# Patient Record
Sex: MALE | Race: WHITE | NOT HISPANIC OR LATINO | Employment: FULL TIME | URBAN - METROPOLITAN AREA
[De-identification: names, ages, dates, MRNs, and addresses within clinical notes are randomized per-mention and may not be internally consistent; named-entity substitution may affect disease eponyms.]

---

## 2017-01-31 DIAGNOSIS — M25.539 PAIN IN WRIST, UNSPECIFIED LATERALITY: Primary | ICD-10-CM

## 2017-06-12 RX ORDER — ATORVASTATIN CALCIUM 10 MG/1
TABLET, FILM COATED ORAL
Qty: 90 TABLET | Refills: 0 | Status: SHIPPED | OUTPATIENT
Start: 2017-06-12 | End: 2017-07-05 | Stop reason: SDUPTHER

## 2017-06-12 RX ORDER — LISINOPRIL 20 MG/1
TABLET ORAL
Qty: 90 TABLET | Refills: 0 | Status: SHIPPED | OUTPATIENT
Start: 2017-06-12 | End: 2017-07-05 | Stop reason: SDUPTHER

## 2017-06-29 VITALS — HEIGHT: 67 IN | WEIGHT: 238.38 LBS | BODY MASS INDEX: 37.42 KG/M2

## 2017-06-29 RX ORDER — VENLAFAXINE HYDROCHLORIDE 150 MG/1
1 CAPSULE, EXTENDED RELEASE ORAL DAILY
COMMUNITY
Start: 2017-02-06 | End: 2017-07-05 | Stop reason: ALTCHOICE

## 2017-07-05 ENCOUNTER — OFFICE VISIT (OUTPATIENT)
Dept: FAMILY MEDICINE | Facility: CLINIC | Age: 42
End: 2017-07-05
Payer: COMMERCIAL

## 2017-07-05 VITALS
HEART RATE: 85 BPM | HEIGHT: 67 IN | WEIGHT: 236 LBS | DIASTOLIC BLOOD PRESSURE: 80 MMHG | SYSTOLIC BLOOD PRESSURE: 132 MMHG | BODY MASS INDEX: 37.04 KG/M2 | OXYGEN SATURATION: 99 %

## 2017-07-05 DIAGNOSIS — E78.5 DYSLIPIDEMIA: ICD-10-CM

## 2017-07-05 DIAGNOSIS — F33.1 MAJOR DEPRESSIVE DISORDER, RECURRENT, MODERATE: ICD-10-CM

## 2017-07-05 DIAGNOSIS — I10 ESSENTIAL HYPERTENSION: Primary | ICD-10-CM

## 2017-07-05 DIAGNOSIS — Z78.9 NON-SMOKER: ICD-10-CM

## 2017-07-05 PROBLEM — G47.00 INSOMNIA: Status: ACTIVE | Noted: 2017-07-05

## 2017-07-05 PROBLEM — F41.8 MIXED ANXIETY DEPRESSIVE DISORDER: Status: ACTIVE | Noted: 2017-07-05

## 2017-07-05 PROCEDURE — 99214 OFFICE O/P EST MOD 30 MIN: CPT | Mod: ,,, | Performed by: NURSE PRACTITIONER

## 2017-07-05 RX ORDER — ATORVASTATIN CALCIUM 20 MG/1
20 TABLET, FILM COATED ORAL NIGHTLY
Qty: 90 TABLET | Refills: 1 | Status: SHIPPED | OUTPATIENT
Start: 2017-07-05 | End: 2018-05-26 | Stop reason: SDUPTHER

## 2017-07-05 RX ORDER — LISINOPRIL 20 MG/1
20 TABLET ORAL DAILY
Qty: 90 TABLET | Refills: 1 | Status: SHIPPED | OUTPATIENT
Start: 2017-07-05 | End: 2018-05-26 | Stop reason: SDUPTHER

## 2017-07-05 RX ORDER — DOXYCYCLINE HYCLATE 100 MG
100 TABLET ORAL EVERY 12 HOURS
Qty: 28 TABLET | Refills: 0 | Status: SHIPPED | OUTPATIENT
Start: 2017-07-05 | End: 2019-12-02

## 2017-07-05 RX ORDER — VENLAFAXINE 75 MG/1
150 TABLET ORAL 2 TIMES DAILY
Qty: 360 TABLET | Refills: 1 | Status: SHIPPED | OUTPATIENT
Start: 2017-07-05 | End: 2019-12-02

## 2017-07-05 NOTE — PATIENT INSTRUCTIONS
Weight Management: Exercise and Activity  Studies show that people who exercise are the most likely to lose weight and keep it off. Exercise burns calories. It helps build muscle to make your body stronger. Make exercise an important part of your weight-management plan.    Make activity part of your day  You may not think you have the time to exercise. But you can work activity into your daily life--you just need to be committed. Take 10 minutes out of your lunch hour to take a walk. Walk to the [employer] to get your paper instead of having it delivered. Make it a habit to take the stairs instead of the elevator. Park in a far away parking spot instead of the closest. Youll be surprised at how fast these little changes can make a difference.  Some people really cannot walk very far, and tire out quickly with exercise. Instead of becoming discouraged, resolve to do what you can do, and work to make that a regular frequent habit.   The benefits of exercise  Exercise offers many benefits including:   · Exercise increases your metabolism (the speed at which your body burns calories).  · Regular exercise can increase the amount of muscle in your body. Muscle burns calories faster than fat. The more muscle you have, the more calories you burn.  · Exercise gives you energy and curbs your appetite.  · Exercise decreases stress and helps you sleep better.  Make exercise fun  Exercise can be fun. Choose an activity you enjoy. You may even get a friend to do it with you:  · Take a resistance-training or aerobics class  · Join a team sport  · Take a dance class  · Walk the dog  · Ride a bike  If you have health problems, be sure to ask your healthcare provider before you start an exercise program. Have a  help you develop a plan thats safe for you.   Date Last Reviewed: 2/4/2016  © 5742-8438 [employer]. [address] [place] [address], [place] [address]. All rights reserved. This information is not  intended as a substitute for professional medical care. Always follow your healthcare professional's instructions.        Depression  Depression is one of the most common mental health problems today. It is not just a state of unhappiness or sadness. It is a true disease. The cause seems to be related to a decrease in chemicals that transmit signals in the brain. Having a family history of depression, alcoholism, or suicide increases the risk. Chronic illness, chronic pain, migraine headaches and high emotional stress also increase the risk.  Depression is something we tend to recognize in others, but may have a hard time seeing in ourselves. It can show in many physical and emotional ways:  · Loss of appetite  · Over-eating  · Not being able to sleep  · Sleeping too much  · Tiredness not related to physical exertion  · Restlessness or irritability  · Slowness of movement or speech  · Feeling depressed or withdrawn  · Loss of interest in things you once enjoyed  · Trouble concentrating, poor memory, trouble making decisions  · Thoughts of harming or killing oneself, or thoughts that life is not worth living  · Low self-esteem  The treatment for depression may include both medicine and psychotherapy. Antidepressants can reduce suffering and can improve the ability to function during the depressed period. Therapy can offer emotional support and help you understand emotional factors that may be causing the depression.  Home care  · On-going care and support helps people manage this disease.  Find a healthcare provider and therapist who meet your needs. Seek help when you feel like you may be getting ill.  · Be kind to yourself. Make it a point to do things that you enjoy (gardening, walking in nature, going to a movie, etc.). Reward yourself for small successes.  · Take care of your physical body. Eat a balanced diet (low in saturated fat and high in fruits and vegetables). Exercise at least 3 times a week for 30  minutes. Even mild-moderate exercise (like brisk walking) can make you feel better.  · Avoid alcohol, which can make depression worse.  · Take medicine as prescribed.  · Tell each of your healthcare providers about all of the prescription drugs, over-the-counter medicines, vitamins, and supplements you take. Certain supplements interact with medicines and can result in dangerous side effects. Ask your pharmacist when you have questions about drug interactions.  · Talk with your family and trusted friends about your feelings and thoughts. Ask them to help you recognize behavior changes early so you can get help and, if needed, medicine can be adjusted.  Follow-up care  Follow up with your healthcare provider, or as advised.  Call 911  Call 911 if you:  · Have suicidal thoughts, a suicide plan, and the means to carry out the plan  · Have trouble breathing  · Are very confused  · Feel very drowsy or have trouble awakening  · Faint or lose consciousness  · Have new chest pain that becomes more severe, lasts longer, or spreads into your shoulder, arm, neck, jaw or back  When to seek medical advice  Call your healthcare provider right away if any of these occur:  · Feeling extreme depression, fear, anxiety, or anger toward yourself or others  · Feeling out of control  · Feeling that you may try to harm yourself or another  · Hearing voices that others do not hear  · Seeing things that others do not see  · Cant sleep or eat for 3 days in a row  · Friends or family express concern over your behavior and ask you to seek help  Date Last Reviewed: 9/29/2015  © 7382-6862 [employer]. [address] [place] [address], [place] [address]. All rights reserved. This information is not intended as a substitute for professional medical care. Always follow your healthcare professional's instructions.

## 2017-07-05 NOTE — PROGRESS NOTES
Subjective:       Patient ID: Jai Watkins is a 41 y.o. male.    Chief Complaint: Anxiety; Hypertension; and Hyperlipidemia    Mr. Watkins is here today for med refills.  Also reports that he had a tick bite with a rash and is concerned about Lyme disease.      Mental Health Problem   The primary symptoms include dysphoric mood. The primary symptoms do not include delusions, hallucinations, bizarre behavior, disorganized speech, negative symptoms or somatic symptoms. The current episode started more than 1 month ago. This is a chronic problem.   The degree of incapacity that he is experiencing as a consequence of his illness is moderate. Additional symptoms of the illness include anhedonia and fatigue. Additional symptoms of the illness do not include no insomnia, no appetite change, no unexpected weight change, no psychomotor retardation, no feelings of worthlessness, no attention impairment, no euphoric mood, no increased goal-directed activity, no flight of ideas, no inflated self-esteem, no decreased need for sleep, not distractible, no poor judgment, no visual change, no headaches, no abdominal pain or no seizures. He does not have a plan to commit suicide. He does not contemplate harming himself. He does not contemplate injuring another person. He has not already  injured another person. Risk factors that are present for mental illness include a history of mental illness and a family history of mental illness.   Hypertension   This is a chronic problem. The current episode started more than 1 year ago. The problem is controlled. Pertinent negatives include no chest pain, headaches, palpitations or shortness of breath. There are no associated agents to hypertension. Risk factors for coronary artery disease include dyslipidemia, family history, male gender and obesity. The current treatment provides significant improvement. There is no history of kidney disease, CAD/MI, heart failure or a thyroid problem. There  is no history of chronic renal disease or a hypertension causing med.   Hyperlipidemia   This is a chronic problem. The current episode started more than 1 year ago. The problem is controlled. He has no history of chronic renal disease. Pertinent negatives include no chest pain, myalgias or shortness of breath. Current antihyperlipidemic treatment includes statins. The current treatment provides moderate improvement of lipids. Compliance problems include adherence to diet and adherence to exercise.  Risk factors for coronary artery disease include dyslipidemia, family history, hypertension, male sex, obesity and stress.       Review of Systems   Constitutional: Positive for fatigue. Negative for activity change, appetite change, chills and unexpected weight change.   HENT: Negative for congestion, rhinorrhea and sore throat.    Eyes: Negative for pain and visual disturbance.   Respiratory: Negative for cough and shortness of breath.    Cardiovascular: Negative for chest pain and palpitations.   Gastrointestinal: Negative for abdominal pain, constipation and diarrhea.   Endocrine: Negative for polydipsia, polyphagia and polyuria.   Genitourinary: Negative for dysuria, frequency and urgency.   Musculoskeletal: Negative for arthralgias, gait problem and myalgias.   Skin: Negative for color change, pallor and rash.   Allergic/Immunologic: Negative for immunocompromised state.   Neurological: Negative for dizziness, seizures, syncope, numbness and headaches.   Hematological: Negative for adenopathy.   Psychiatric/Behavioral: Positive for dysphoric mood. Negative for confusion, hallucinations, self-injury and suicidal ideas. The patient is nervous/anxious. The patient does not have insomnia.         Past Surgical History:   Procedure Laterality Date    ADENOIDECTOMY      ANAL FISTULOTOMY      CARPAL TUNNEL RELEASE      ROTATOR CUFF REPAIR  2011    right    SINUS SURGERY  2011    TONSILLECTOMY       "UVULOPALATOPHARYNGOPLASTY  2011       Family History   Problem Relation Age of Onset    Hypertension Mother     Heart disease Mother 55     CAD, CABG    Diabetes Mother     Heart attack Mother     Arthritis Mother     Depression Mother     Hypertension Father     Cancer Father      dad with elevated PSA    Arthritis Father     Kidney disease Father     Depression Father     Hearing loss Father     Allergic rhinitis Sister     Allergies Sister     Angioedema Sister     Asthma Neg Hx     Eczema Neg Hx     Immunodeficiency Neg Hx         Social History     Social History    Marital status: Single     Spouse name: N/A    Number of children: N/A    Years of education: N/A     Occupational History    horticuturalist [employer]     Social History Main Topics    Smoking status: Never Smoker    Smokeless tobacco: Never Used    Alcohol use Yes      Comment: occasional    Drug use:      Types: Benzodiazepines    Sexual activity: No     Other Topics Concern    None     Social History Narrative    None       Current Outpatient Prescriptions   Medication Sig Dispense Refill    atorvastatin (LIPITOR) 20 MG tablet Take 1 tablet (20 mg total) by mouth every evening. 90 tablet 1    lisinopril (PRINIVIL,ZESTRIL) 20 MG tablet Take 1 tablet (20 mg total) by mouth once daily. 90 tablet 1    doxycycline (VIBRA-TABS) 100 MG tablet Take 1 tablet (100 mg total) by mouth every 12 (twelve) hours. 28 tablet 0    venlafaxine (EFFEXOR) 75 MG tablet Take 2 tablets (150 mg total) by mouth 2 (two) times daily. 360 tablet 1     No current facility-administered medications for this visit.        Review of patient's allergies indicates:   Allergen Reactions    Levofloxacin Shortness Of Breath and Hives    Quinolones Hives and Shortness Of Breath      Objective:   Blood pressure 132/80, pulse 85, height 5' 6.5" (1.689 m), weight 107 kg (236 lb), SpO2 99 %. Body mass index is 37.52 kg/m².       Physical Exam "   Constitutional: He is oriented to person, place, and time. He appears well-developed and well-nourished.   HENT:   Head: Normocephalic and atraumatic.   Right Ear: External ear normal.   Left Ear: External ear normal.   Nose: Nose normal.   Mouth/Throat: Oropharynx is clear and moist.   Eyes: Conjunctivae, EOM and lids are normal. Pupils are equal, round, and reactive to light. No scleral icterus.   Neck: Normal range of motion. Neck supple. Carotid bruit is not present. No tracheal deviation present. No thyromegaly present.   Cardiovascular: Normal rate, regular rhythm, normal heart sounds and intact distal pulses.  Exam reveals no gallop and no friction rub.    Pulmonary/Chest: Effort normal and breath sounds normal.   Abdominal: Soft. Bowel sounds are normal. There is no tenderness.   Musculoskeletal: Normal range of motion. He exhibits no edema.   Lymphadenopathy:     He has no cervical adenopathy.   Neurological: He is alert and oriented to person, place, and time.   Skin: Skin is warm, dry and intact.   Psychiatric: He has a normal mood and affect. His speech is normal and behavior is normal. Judgment and thought content normal. He is not actively hallucinating. Thought content is not paranoid and not delusional. He expresses no homicidal and no suicidal ideation. He expresses no suicidal plans and no homicidal plans. He is attentive.        Assessment:       1. Essential hypertension    2. Dyslipidemia    3. Major depressive disorder, recurrent, moderate    4. Non-smoker        Plan:       Jai was seen today for anxiety, hypertension and hyperlipidemia.    Diagnoses and all orders for this visit:    Essential hypertension   Stable; continue current medications.  -     lisinopril (PRINIVIL,ZESTRIL) 20 MG tablet; Take 1 tablet (20 mg total) by mouth once daily.    Dyslipidemia   Improved, but not at goal; increase lipitor to 20mg nightly  -     atorvastatin (LIPITOR) 20 MG tablet; Take 1 tablet (20 mg  total) by mouth every evening.    Major depressive disorder, recurrent, moderate  -     venlafaxine (EFFEXOR) 75 MG tablet; Take 2 tablets (150 mg total) by mouth 2 (two) times daily.    Non-smoker    Other orders  -     doxycycline (VIBRA-TABS) 100 MG tablet; Take 1 tablet (100 mg total) by mouth every 12 (twelve) hours.

## 2018-05-26 DIAGNOSIS — E78.5 DYSLIPIDEMIA: ICD-10-CM

## 2018-05-26 DIAGNOSIS — I10 ESSENTIAL HYPERTENSION: ICD-10-CM

## 2018-05-29 RX ORDER — ATORVASTATIN CALCIUM 20 MG/1
TABLET, FILM COATED ORAL
Qty: 30 TABLET | Refills: 0 | Status: SHIPPED | OUTPATIENT
Start: 2018-05-29 | End: 2019-12-02

## 2018-05-29 RX ORDER — LISINOPRIL 20 MG/1
TABLET ORAL
Qty: 30 TABLET | Refills: 0 | Status: SHIPPED | OUTPATIENT
Start: 2018-05-29 | End: 2019-12-02 | Stop reason: SDUPTHER

## 2018-06-03 ENCOUNTER — HOSPITAL ENCOUNTER (EMERGENCY)
Facility: HOSPITAL | Age: 43
Discharge: HOME | End: 2018-06-03
Attending: EMERGENCY MEDICINE
Payer: MEDICAID

## 2018-06-03 VITALS
OXYGEN SATURATION: 97 % | BODY MASS INDEX: 39.24 KG/M2 | SYSTOLIC BLOOD PRESSURE: 139 MMHG | TEMPERATURE: 98 F | HEIGHT: 67 IN | HEART RATE: 73 BPM | WEIGHT: 250 LBS | DIASTOLIC BLOOD PRESSURE: 78 MMHG

## 2018-06-03 DIAGNOSIS — S06.0X1A CONCUSSION WITH LOSS OF CONSCIOUSNESS OF 30 MINUTES OR LESS, INITIAL ENCOUNTER: Primary | ICD-10-CM

## 2018-06-03 DIAGNOSIS — H53.8 BLURRED VISION, LEFT EYE: ICD-10-CM

## 2018-06-03 DIAGNOSIS — M54.6 THORACIC BACK PAIN: ICD-10-CM

## 2018-06-03 PROCEDURE — 99284 EMERGENCY DEPT VISIT MOD MDM: CPT

## 2018-06-03 PROCEDURE — 25000003 PHARM REV CODE 250: Performed by: EMERGENCY MEDICINE

## 2018-06-03 RX ORDER — ACETAMINOPHEN 325 MG/1
650 TABLET ORAL
Status: COMPLETED | OUTPATIENT
Start: 2018-06-03 | End: 2018-06-03

## 2018-06-03 RX ADMIN — ACETAMINOPHEN 650 MG: 325 TABLET, FILM COATED ORAL at 08:06

## 2018-06-03 NOTE — ED PROVIDER NOTES
"Encounter Date: 6/3/2018    SCRIBE #1 NOTE: I, Carl Smith, am scribing for, and in the presence of, Dr. Martinez .       History     Chief Complaint   Patient presents with    Fall     pt reports falling 5 or 6 feet off of ladder PTA, pt reports +LOC reports blurred vision in lt eye co neck and lt arm pain    Head Injury     Time seen by provider: 7:51 AM on 06/03/2018    Chief Complaint: headache     Jai Watkins is a 42 y.o. male with a PMHx of depression, anxiety, HTN, and eczema who presents to the ED for further evaluation of an headache with associated left eye visual disturabances status post mechanical fall from ladder with onset <1 hour PTA. Patient reports that he is having left shoulder pain, right sided rib pain, headache, neck pain, and visual disturbances. Patient states that he was working on his fathers boat on a 6ft ladder, when he fell on his left side after feeling unstable. Father relays that the patient had positive LOC, along with striking his head when he fell. Father admits that the patient was disoriented, stating that the patient's questioning was repetitive. Patient states that he is having a headache and he has a "knot" to the back of his head. He admits to having left eye visual disturbances, that he describes as "broken glass" and "cracked vision." Patient states that he typically has 20/20 vision. He admits to taking an 81mg aspirin daily. Patient admits that that he is having sore neck soreness and deep pain. He is also complaining of left shoulder pain that is just an achy like pain that is constant currently. Patient states that his right sided pain rib pain is dull. Patient denies SOB, chest pain, abdominal pain, nausea, vomiting, diarrhea, cough, fever, weakness, numbness, and difficulty ambulating. Patient has a PSHx of right rotator cuff surgery, tonsillectomy, and adenoidectomy.         The history is provided by the patient.     Review of patient's allergies indicates: "   Allergen Reactions    Levofloxacin Shortness Of Breath and Hives    Quinolones Hives and Shortness Of Breath     Past Medical History:   Diagnosis Date    Anxiety     Depression     Eczema 10/30/2013    Hyperlipidemia LDL goal < 130     Hypertension     takes Metoprolol, also helps with palpitations    Recurrent upper respiratory infection (URI)     Sleep apnea     resolved with UPPP     Past Surgical History:   Procedure Laterality Date    ADENOIDECTOMY      ANAL FISTULOTOMY      CARPAL TUNNEL RELEASE      ROTATOR CUFF REPAIR  2011    right    SINUS SURGERY  2011    TONSILLECTOMY      UVULOPALATOPHARYNGOPLASTY  2011     Family History   Problem Relation Age of Onset    Hypertension Mother     Heart disease Mother 55        CAD, CABG    Diabetes Mother     Heart attack Mother     Arthritis Mother     Depression Mother     Hypertension Father     Cancer Father         dad with elevated PSA    Arthritis Father     Kidney disease Father     Depression Father     Hearing loss Father     Allergic rhinitis Sister     Allergies Sister     Angioedema Sister     Asthma Neg Hx     Eczema Neg Hx     Immunodeficiency Neg Hx      Social History   Substance Use Topics    Smoking status: Never Smoker    Smokeless tobacco: Never Used    Alcohol use Yes      Comment: occasional     Review of Systems   All other systems reviewed and are negative.  REVIEW OF SYSTEMS  CONSTITUTIONAL: Negative for fever.  HEENT:  Negative for sore throat. +visual disturbances.   HEART:   Negative for chest pain..  LUNG:  Negative for shortness of breath or cough.   ABDOMEN:  Negative for nausea, vomiting, diarrhea, and abdominal pain.    :  No discharge, dysuria  EXTREMITIES:  No swelling. +neck pain, arthralgia, and myalgia.   NEURO:  Negative for weakness and numbness. +headache and syncope.   SKIN:  Negative for rash.  Psych: No depression  HEME: Does not bruise/bleed easily.             Physical Exam      Initial Vitals [06/03/18 0746]   BP Pulse Resp Temp SpO2   (!) 145/82 84 -- 98.3 °F (36.8 °C) 98 %      MAP       103         Physical Exam    Nursing note and vitals reviewed.  Constitutional: He appears well-developed and well-nourished.  Non-toxic appearance. No distress.   HENT:   Head: Normocephalic.   Occipital scalp hematoma with abrasion    Eyes: EOM are normal. Pupils are equal, round, and reactive to light.   EMOI PERRL  No hemotympanum      Neck: Normal range of motion. Neck supple. No neck rigidity. No JVD present.   Cardiovascular: Normal rate, regular rhythm, normal heart sounds and intact distal pulses. Exam reveals no gallop and no friction rub.    No murmur heard.  Pulmonary/Chest: Breath sounds normal. He has no wheezes. He has no rhonchi. He has no rales.   Abdominal: Soft. Bowel sounds are normal. He exhibits no distension. There is no tenderness. There is no rigidity, no rebound and no guarding.   Musculoskeletal: Normal range of motion. He exhibits tenderness.   Full ROM of left upper extremity without crepitus or tenderness   Abrasion to the left forearm without ecchymosis or deformity   2+radial pulses bilaterally. Good capillary refill. Sensation intact.   tenderness over the right lateral rib margins   Upper thoracic spine tenderness around  t3-4  Tenderness to the left trapezius   No bilateral clavicle tenderness   No pelvic instability    Neurological: He is alert and oriented to person, place, and time. He has normal strength and normal reflexes. No cranial nerve deficit or sensory deficit. He exhibits normal muscle tone. Coordination normal. GCS eye subscore is 4. GCS verbal subscore is 5. GCS motor subscore is 6.   Skin: Skin is warm and dry.   Psychiatric: He has a normal mood and affect. His speech is normal and behavior is normal. He is not actively hallucinating.         ED Course   Procedures  Labs Reviewed - No data to display          Medical Decision Making:   History:    Old Medical Records: I decided to obtain old medical records.  Initial Assessment:   Patient is a 42-year-old male who presents emergency department after falling off of a 5-6 foot ladder and hitting his head on the ground.  He suggests brief loss of conscious likely.  He was confused afterwards.  No nausea or vomiting. He endorses some blurred vision out of the left eye with under appearance of as if he is looking through a broken glass.  Attempted funduscopic exam but unable to clearly visualize the retina.  Initial  Visual Acuity Right Eye   Right Visual Status: Uncorrected Right Visual Test: 20/30  Left Eye   Left Visual Status: Uncorrected Left Visual Test: 20/70  Both Eyes   Both Visual Status: Uncorrected Both Visual Test : 20/25    Head CT a negative but did show the presence of a incidental hyperdense structure along the course of the left distal vertebral artery just proximal to the basal artery for which is small aneurysm is not excluded.  Patient informed of these findings and need for follow-up for a on emergent CTA.  Chest x-ray him back x-ray showed no acute fracture or dislocation.  Patient's vision improved on reassessment but still in dorsal is some slight blurred vision.  Visual Acuity Right Eye   Right Visual Test: 20/40   Left Eye   Left Visual Test: 20/50   Both Eyes   Both Visual Test : 20/20    Suspect his symptoms are likely secondary to concussion although cannot rule out retinal hemorrhage or detachment.  Vision is improving.  Patient will have to follow up with Ophthalmology in the next 24 hr for more thorough Ophthalmological evaluation.  Patient also provided with follow-up information for concussion Clinic.  Sec impact syndrome discussed.  Avoid use of NSAIDs the next 24 hr.  Return precautions were discussed for worsening vision changes, headache, neurological changes or any worsening symptoms.  Patient is discharged improved in no acute distress.              Clinical Tests:    Radiological Study: Reviewed and Ordered  ED Management:  0904  tonometry in eye preformed and about 22mL in Hg bilaterally   no longer seeing any floaters and vision has improved.   Suspect concusion.             Scribe Attestation:   Scribe #1: I performed the above scribed service and the documentation accurately describes the services I performed. I attest to the accuracy of the note.    I, Juan Adorno, personally performed the services described in this documentation. All medical record entries made by the scribe were at my direction and in my presence.  I have reviewed the chart and agree that the record reflects my personal performance and is accurate and complete. Jesus Martinez MD.  2:10 PM 06/03/2018             Clinical Impression:   The primary encounter diagnosis was Concussion with loss of consciousness of 30 minutes or less, initial encounter. Diagnoses of Thoracic back pain and Blurred vision, left eye were also pertinent to this visit.    X-Ray Thoracic Spine AP Lateral   Final Result      No acute thoracic spine injury.         Electronically signed by: Shailesh Alejo MD   Date:    06/03/2018   Time:    08:37      X-Ray Chest PA And Lateral   Final Result      No acute process.         Electronically signed by: Shailesh Alejo MD   Date:    06/03/2018   Time:    08:36      CT Head Without Contrast   Final Result      No acute intracranial process or acute cervical spine injury.      Finding for which a small aneurysm is not excluded and CTA head is recommended on a nonemergent basis.         Electronically signed by: Shailesh Alejo MD   Date:    06/03/2018   Time:    08:27      CT Cervical Spine Without Contrast   Final Result      No acute intracranial process or acute cervical spine injury.      Finding for which a small aneurysm is not excluded and CTA head is recommended on a nonemergent basis.         Electronically signed by: Shailesh Alejo MD   Date:    06/03/2018    Time:    08:27          Disposition:   Disposition: Discharged  Condition: Stable                        Jesus Martinez MD  06/03/18 9892

## 2018-06-04 ENCOUNTER — HOSPITAL ENCOUNTER (EMERGENCY)
Facility: HOSPITAL | Age: 43
Discharge: HOME | End: 2018-06-04
Attending: EMERGENCY MEDICINE
Payer: MEDICAID

## 2018-06-04 VITALS
WEIGHT: 260.56 LBS | HEART RATE: 97 BPM | BODY MASS INDEX: 39.49 KG/M2 | HEIGHT: 68 IN | TEMPERATURE: 98 F | OXYGEN SATURATION: 95 % | SYSTOLIC BLOOD PRESSURE: 136 MMHG | RESPIRATION RATE: 20 BRPM | DIASTOLIC BLOOD PRESSURE: 78 MMHG

## 2018-06-04 DIAGNOSIS — R07.81 RIB PAIN ON LEFT SIDE: ICD-10-CM

## 2018-06-04 DIAGNOSIS — S20.212A RIB CONTUSION, LEFT, INITIAL ENCOUNTER: Primary | ICD-10-CM

## 2018-06-04 PROCEDURE — 94799 UNLISTED PULMONARY SVC/PX: CPT

## 2018-06-04 PROCEDURE — 63600175 PHARM REV CODE 636 W HCPCS: Performed by: PHYSICIAN ASSISTANT

## 2018-06-04 PROCEDURE — 99284 EMERGENCY DEPT VISIT MOD MDM: CPT | Mod: 25

## 2018-06-04 PROCEDURE — 96372 THER/PROPH/DIAG INJ SC/IM: CPT

## 2018-06-04 PROCEDURE — 99900035 HC TECH TIME PER 15 MIN (STAT)

## 2018-06-04 RX ORDER — KETOROLAC TROMETHAMINE 30 MG/ML
30 INJECTION, SOLUTION INTRAMUSCULAR; INTRAVENOUS
Status: COMPLETED | OUTPATIENT
Start: 2018-06-04 | End: 2018-06-04

## 2018-06-04 RX ORDER — ORPHENADRINE CITRATE 30 MG/ML
60 INJECTION INTRAMUSCULAR; INTRAVENOUS
Status: COMPLETED | OUTPATIENT
Start: 2018-06-04 | End: 2018-06-04

## 2018-06-04 RX ORDER — HYDROCODONE BITARTRATE AND ACETAMINOPHEN 5; 325 MG/1; MG/1
1 TABLET ORAL 4 TIMES DAILY PRN
Qty: 12 TABLET | Refills: 0 | Status: SHIPPED | OUTPATIENT
Start: 2018-06-04 | End: 2019-12-02

## 2018-06-04 RX ADMIN — KETOROLAC TROMETHAMINE 30 MG: 30 INJECTION, SOLUTION INTRAMUSCULAR at 10:06

## 2018-06-04 RX ADMIN — ORPHENADRINE CITRATE 60 MG: 30 INJECTION INTRAMUSCULAR; INTRAVENOUS at 10:06

## 2018-06-05 NOTE — ED NOTES
RT notified of need for IS   Pt was prescribed Bactrim DS BID x 10 days. She does report it is better. Bacterial cx was positive for staph but sensitive to Bactrim. Viral cx is negative. Cont Aquaphor several times a day, pt warned of a scar Detail Level: Simple

## 2018-06-05 NOTE — PROGRESS NOTES
06/04/18 2220   Patient Assessment/Suction   Level of Consciousness (AVPU) alert   Respiratory Effort Unlabored   Expansion/Accessory Muscles/Retractions no retractions;no use of accessory muscles   All Lung Fields Breath Sounds clear   Rhythm/Pattern, Respiratory (splinted due to pain )   Cough Frequency infrequent   Cough Type nonproductive   Incentive Spirometer   $ Incentive Spirometer Charges done with encouragement;ready for self-administration;breath hold utilized;proper technique demonstrated   Predicted Level (mL) (Incentive Spirometer) 3000   Administration (Incentive Spirometer) done with encouragement   Number of Repetitions (Incentive Spirometer) 10   Level (mL) (Incentive Spirometer) 1750   Patient Tolerance good

## 2018-06-05 NOTE — ED PROVIDER NOTES
Encounter Date: 6/4/2018    SCRIBE #1 NOTE: I, Georgi Grewal, am scribing for, and in the presence of, Anais Sharp PA-C.       History     Chief Complaint   Patient presents with    Back Pain     Here for fall off ladder yesterday. Has started having back pain since then. Hurts with movement     06/04/2018 9:43 PM     Chief complaint:  Back Pain (Here for fall off ladder yesterday. Has started having back pain since then. Hurts with movement)    Jai Watkins III is a 42 y.o. male who has a past medical history of Anxiety; Depression; Eczema (10/30/2013); Hyperlipidemia LDL goal < 130; Hypertension; Recurrent upper respiratory infection (URI); and Sleep apnea.    The patient presents to the ED with an acute onset of back pain that wraps around to his chest with no other associated symptoms. He reports that he was seen at this ED yesterday s/p fall from an 8-10 foot ladder while sanding the side of a boat. He adds that the leg of the ladder twisted and broke off causing him and the ladder to fall. He hit his head, lost consciousness, and incurred a back injury that has persisted until today.  No pertinent Shx noted. He presents with no other acute complaints.           The history is provided by the patient.     Review of patient's allergies indicates:   Allergen Reactions    Levofloxacin Shortness Of Breath and Hives    Quinolones Hives and Shortness Of Breath     Past Medical History:   Diagnosis Date    Anxiety     Depression     Eczema 10/30/2013    Hyperlipidemia LDL goal < 130     Hypertension     takes Metoprolol, also helps with palpitations    Recurrent upper respiratory infection (URI)     Sleep apnea     resolved with UPPP     Past Surgical History:   Procedure Laterality Date    ADENOIDECTOMY      ANAL FISTULOTOMY      CARPAL TUNNEL RELEASE      ROTATOR CUFF REPAIR  2011    right    SINUS SURGERY  2011    TONSILLECTOMY      UVULOPALATOPHARYNGOPLASTY  2011     Family History    Problem Relation Age of Onset    Hypertension Mother     Heart disease Mother 55        CAD, CABG    Diabetes Mother     Heart attack Mother     Arthritis Mother     Depression Mother     Hypertension Father     Cancer Father         dad with elevated PSA    Arthritis Father     Kidney disease Father     Depression Father     Hearing loss Father     Allergic rhinitis Sister     Allergies Sister     Angioedema Sister     Asthma Neg Hx     Eczema Neg Hx     Immunodeficiency Neg Hx      Social History   Substance Use Topics    Smoking status: Never Smoker    Smokeless tobacco: Never Used    Alcohol use Yes      Comment: occasional     Review of Systems   Constitutional: Negative for chills and fever.   HENT: Negative for facial swelling and trouble swallowing.    Eyes: Negative for discharge.   Respiratory: Negative for cough, chest tightness, shortness of breath and wheezing.    Cardiovascular: Positive for chest pain. Negative for palpitations.   Gastrointestinal: Negative for abdominal pain, diarrhea, nausea and vomiting.   Genitourinary: Negative for dysuria and hematuria.   Musculoskeletal: Positive for arthralgias, back pain and myalgias. Negative for joint swelling, neck pain and neck stiffness.   Skin: Negative for color change, pallor, rash and wound.   Neurological: Negative for dizziness, syncope, weakness, light-headedness, numbness and headaches.   Hematological: Does not bruise/bleed easily.   Psychiatric/Behavioral: The patient is not nervous/anxious.        Physical Exam     Initial Vitals [06/04/18 2108]   BP Pulse Resp Temp SpO2   136/78 97 20 98.3 °F (36.8 °C) 95 %      MAP       97.33         Physical Exam    Nursing note and vitals reviewed.  Constitutional: He appears well-developed and well-nourished. He is not diaphoretic. No distress.   HENT:   Head: Normocephalic and atraumatic.   Neck: Normal range of motion. Neck supple.   Cardiovascular: Normal rate, regular rhythm,  normal heart sounds and intact distal pulses. Exam reveals no gallop and no friction rub.    No murmur heard.  Pulmonary/Chest: Breath sounds normal. No respiratory distress. He has no wheezes. He has no rhonchi. He has no rales.         He exhibits tenderness.   Equal, bilateral breath sounds noted without wheezing.      Abdominal: Soft. He exhibits no distension and no mass. There is no tenderness.   Musculoskeletal: Normal range of motion. He exhibits tenderness. He exhibits no edema.   Tenderness to palpation of the left thoracic paraspinal muscles, extending to the left lateral ribcage and left anterior chest wall.  No deformity noted.  No crepitus.  No midline spinal process tenderness noted.     Neurological: He is alert and oriented to person, place, and time. He has normal strength. No sensory deficit.   Skin: Skin is warm and dry. No rash and no abscess noted. No erythema.   Psychiatric: He has a normal mood and affect.         ED Course   Procedures  Labs Reviewed - No data to display          Medical Decision Making:   History:   Old Medical Records: I decided to obtain old medical records.  Differential Diagnosis:   Fracture  Dislocation  Sprain  Contusion  Strain  Spasm      Clinical Tests:   Radiological Study: Ordered and Reviewed       APC / Resident Notes:   X-rays of the left ribs and chest x-ray shows no acute abnormalities, fractures or dislocations.  He is given a dose of IM Toradol and Norflex here in the emergency department.  He is discharged home with a prescription for Norco.       Scribe Attestation:   Scribe #1: I performed the above scribed service and the documentation accurately describes the services I performed. I attest to the accuracy of the note.    Attending Attestation:     Physician Attestation Statement for NP/PA:   I have conducted a face to face encounter with this patient in addition to the NP/PA, due to NP/PA Request    Other NP/PA Attestation Additions:      Medical  Decision Making: Jai Watkins III is a 42 y.o. male presenting with left posterolateral back pain over the level of around T10 consistent with posterior chest wall contusion.  There is no midline vertebral tenderness on my examination. He has 5/5 distal strength sensation.  Lungs are clear to auscultation and equal breath sounds.  No increased work of breathing.  No abdominal tenderness to palpation.  Low suspicion for emergent intra-abdominal process such as splenic laceration.  I do not think abdominal imaging is indicated.  There is no sign of spinal cord injury. I doubt vertebral injury. X-rays here show no displaced rib fracture, hemothorax, pneumothorax.  I do not think further CT is indicated.  Analgesia as necessary initiated here with prescription as necessary for home.  Follow up with PCP.  Cranial nerves 3-12 intact with normal mental status and 5/5 strength sensation.  Likely concussion.  Low suspicion for intracranial hemorrhage. I do not think repeat head injury imaging is indicated.  He is able to rotate his head 45° in both directions without difficulty.  Low suspicion for cervical vertebral injury. I do not think repeat cervical imaging is indicated.  Return precautions reviewed.           I, Anais Sharp PA-C, personally performed the services described in this documentation. All medical record entries made by the scribe were at my direction and in my presence.  I have reviewed the chart and agree that the record reflects my personal performance and is accurate and complete. Anais Sharp PA-C.  12:29 AM 06/05/2018          ED Course as of Jun 04 2259   Mon Jun 04, 2018 2221 CXR:  NAD. (my read)  [MR]      ED Course User Index  [MR] Rusty Soto MD     Clinical Impression:   No diagnosis found.    1. Rib contusion, left, initial encounter    2. Rib pain on left side        No orders to display       Disposition:   Disposition: Discharged  Condition:  Stable                        Anais Sharp PA-C  06/05/18 0029

## 2019-11-19 ENCOUNTER — TELEPHONE (OUTPATIENT)
Dept: FAMILY MEDICINE | Facility: CLINIC | Age: 44
End: 2019-11-19

## 2019-11-19 DIAGNOSIS — G47.33 OSA (OBSTRUCTIVE SLEEP APNEA): ICD-10-CM

## 2019-11-19 DIAGNOSIS — E78.5 DYSLIPIDEMIA: ICD-10-CM

## 2019-11-19 DIAGNOSIS — F33.1 MAJOR DEPRESSIVE DISORDER, RECURRENT, MODERATE: ICD-10-CM

## 2019-11-19 DIAGNOSIS — I10 ESSENTIAL HYPERTENSION: Primary | ICD-10-CM

## 2019-11-30 LAB
ALBUMIN SERPL-MCNC: 4.6 G/DL (ref 3.5–5.5)
ALBUMIN/GLOB SERPL: 1.8 {RATIO} (ref 1.2–2.2)
ALP SERPL-CCNC: 77 IU/L (ref 39–117)
ALT SERPL-CCNC: 65 IU/L (ref 0–44)
APPEARANCE UR: CLEAR
AST SERPL-CCNC: 38 IU/L (ref 0–40)
BASOPHILS # BLD AUTO: 0 X10E3/UL (ref 0–0.2)
BASOPHILS NFR BLD AUTO: 0 %
BILIRUB SERPL-MCNC: 0.4 MG/DL (ref 0–1.2)
BILIRUB UR QL STRIP: NEGATIVE
BUN SERPL-MCNC: 17 MG/DL (ref 6–24)
BUN/CREAT SERPL: 16 (ref 9–20)
CALCIUM SERPL-MCNC: 9.8 MG/DL (ref 8.7–10.2)
CHLORIDE SERPL-SCNC: 105 MMOL/L (ref 96–106)
CHOLEST SERPL-MCNC: 220 MG/DL (ref 100–199)
CO2 SERPL-SCNC: 22 MMOL/L (ref 20–29)
COLOR UR: YELLOW
CREAT SERPL-MCNC: 1.08 MG/DL (ref 0.76–1.27)
EOSINOPHIL # BLD AUTO: 0.1 X10E3/UL (ref 0–0.4)
EOSINOPHIL NFR BLD AUTO: 1 %
ERYTHROCYTE [DISTWIDTH] IN BLOOD BY AUTOMATED COUNT: 13.2 % (ref 12.3–15.4)
GLOBULIN SER CALC-MCNC: 2.5 G/DL (ref 1.5–4.5)
GLUCOSE SERPL-MCNC: 104 MG/DL (ref 65–99)
GLUCOSE UR QL: NEGATIVE
HCT VFR BLD AUTO: 47.8 % (ref 37.5–51)
HDLC SERPL-MCNC: 40 MG/DL
HGB BLD-MCNC: 15.9 G/DL (ref 13–17.7)
HGB UR QL STRIP: NEGATIVE
IMM GRANULOCYTES # BLD AUTO: 0 X10E3/UL (ref 0–0.1)
IMM GRANULOCYTES NFR BLD AUTO: 1 %
KETONES UR QL STRIP: NEGATIVE
LDLC SERPL CALC-MCNC: 144 MG/DL (ref 0–99)
LEUKOCYTE ESTERASE UR QL STRIP: NEGATIVE
LYMPHOCYTES # BLD AUTO: 1.9 X10E3/UL (ref 0.7–3.1)
LYMPHOCYTES NFR BLD AUTO: 30 %
MCH RBC QN AUTO: 31.4 PG (ref 26.6–33)
MCHC RBC AUTO-ENTMCNC: 33.3 G/DL (ref 31.5–35.7)
MCV RBC AUTO: 94 FL (ref 79–97)
MICRO URNS: NORMAL
MONOCYTES # BLD AUTO: 0.6 X10E3/UL (ref 0.1–0.9)
MONOCYTES NFR BLD AUTO: 9 %
NEUTROPHILS # BLD AUTO: 3.9 X10E3/UL (ref 1.4–7)
NEUTROPHILS NFR BLD AUTO: 59 %
NITRITE UR QL STRIP: NEGATIVE
PH UR STRIP: 5 [PH] (ref 5–7.5)
PLATELET # BLD AUTO: 216 X10E3/UL (ref 150–450)
POTASSIUM SERPL-SCNC: 4.5 MMOL/L (ref 3.5–5.2)
PROT SERPL-MCNC: 7.1 G/DL (ref 6–8.5)
PROT UR QL STRIP: NEGATIVE
RBC # BLD AUTO: 5.07 X10E6/UL (ref 4.14–5.8)
SODIUM SERPL-SCNC: 141 MMOL/L (ref 134–144)
SP GR UR: 1.02 (ref 1–1.03)
TRIGL SERPL-MCNC: 182 MG/DL (ref 0–149)
TSH SERPL DL<=0.005 MIU/L-ACNC: 1.3 UIU/ML (ref 0.45–4.5)
UROBILINOGEN UR STRIP-MCNC: 0.2 MG/DL (ref 0.2–1)
VLDLC SERPL CALC-MCNC: 36 MG/DL (ref 5–40)
WBC # BLD AUTO: 6.5 X10E3/UL (ref 3.4–10.8)

## 2019-12-02 ENCOUNTER — OFFICE VISIT (OUTPATIENT)
Dept: FAMILY MEDICINE | Facility: CLINIC | Age: 44
End: 2019-12-02
Payer: COMMERCIAL

## 2019-12-02 VITALS
HEART RATE: 102 BPM | BODY MASS INDEX: 38.04 KG/M2 | HEIGHT: 68 IN | DIASTOLIC BLOOD PRESSURE: 78 MMHG | TEMPERATURE: 98 F | SYSTOLIC BLOOD PRESSURE: 140 MMHG | OXYGEN SATURATION: 98 % | WEIGHT: 251 LBS

## 2019-12-02 DIAGNOSIS — G47.33 OBSTRUCTIVE SLEEP APNEA SYNDROME: ICD-10-CM

## 2019-12-02 DIAGNOSIS — F41.8 MIXED ANXIETY DEPRESSIVE DISORDER: ICD-10-CM

## 2019-12-02 DIAGNOSIS — G47.26 SHIFT WORK SLEEP DISORDER: ICD-10-CM

## 2019-12-02 DIAGNOSIS — E78.5 DYSLIPIDEMIA: ICD-10-CM

## 2019-12-02 DIAGNOSIS — R73.01 IMPAIRED FASTING GLUCOSE: ICD-10-CM

## 2019-12-02 DIAGNOSIS — I10 ESSENTIAL HYPERTENSION: Primary | ICD-10-CM

## 2019-12-02 DIAGNOSIS — R90.89 ABNORMAL CT OF BRAIN: ICD-10-CM

## 2019-12-02 PROBLEM — M77.8 TENDINITIS OF LEFT WRIST: Status: ACTIVE | Noted: 2017-07-07

## 2019-12-02 PROBLEM — Z98.890 HISTORY OF CARPAL TUNNEL RELEASE: Status: ACTIVE | Noted: 2017-07-07

## 2019-12-02 PROBLEM — G47.30 SLEEP APNEA: Status: ACTIVE | Noted: 2019-12-02

## 2019-12-02 PROCEDURE — 3077F SYST BP >= 140 MM HG: CPT | Mod: S$GLB,,, | Performed by: INTERNAL MEDICINE

## 2019-12-02 PROCEDURE — 99214 OFFICE O/P EST MOD 30 MIN: CPT | Mod: S$GLB,,, | Performed by: INTERNAL MEDICINE

## 2019-12-02 PROCEDURE — 3008F PR BODY MASS INDEX (BMI) DOCUMENTED: ICD-10-PCS | Mod: S$GLB,,, | Performed by: INTERNAL MEDICINE

## 2019-12-02 PROCEDURE — 3077F PR MOST RECENT SYSTOLIC BLOOD PRESSURE >= 140 MM HG: ICD-10-PCS | Mod: S$GLB,,, | Performed by: INTERNAL MEDICINE

## 2019-12-02 PROCEDURE — 99214 PR OFFICE/OUTPT VISIT, EST, LEVL IV, 30-39 MIN: ICD-10-PCS | Mod: S$GLB,,, | Performed by: INTERNAL MEDICINE

## 2019-12-02 PROCEDURE — 3078F DIAST BP <80 MM HG: CPT | Mod: S$GLB,,, | Performed by: INTERNAL MEDICINE

## 2019-12-02 PROCEDURE — 3078F PR MOST RECENT DIASTOLIC BLOOD PRESSURE < 80 MM HG: ICD-10-PCS | Mod: S$GLB,,, | Performed by: INTERNAL MEDICINE

## 2019-12-02 PROCEDURE — 3008F BODY MASS INDEX DOCD: CPT | Mod: S$GLB,,, | Performed by: INTERNAL MEDICINE

## 2019-12-02 RX ORDER — LISINOPRIL 20 MG/1
TABLET ORAL
Qty: 90 TABLET | Refills: 1 | Status: SHIPPED | OUTPATIENT
Start: 2019-12-02 | End: 2020-03-04 | Stop reason: ALTCHOICE

## 2019-12-02 RX ORDER — TRAZODONE HYDROCHLORIDE 50 MG/1
50 TABLET ORAL NIGHTLY
Qty: 90 TABLET | Refills: 1 | Status: SHIPPED | OUTPATIENT
Start: 2019-12-02 | End: 2020-03-04 | Stop reason: SDUPTHER

## 2019-12-02 RX ORDER — ATORVASTATIN CALCIUM 20 MG/1
TABLET, FILM COATED ORAL
Qty: 90 TABLET | Refills: 1 | Status: SHIPPED | OUTPATIENT
Start: 2019-12-02 | End: 2020-03-04 | Stop reason: SDUPTHER

## 2019-12-02 NOTE — PROGRESS NOTES
Subjective:       Patient ID: Jai Watkins III is a 44 y.o. male.    Chief Complaint: Hypertension (refill medication); Hyperlipidemia; Follow-up (followup labs); Insomnia (difficult sleep pattern); and Fall    Here to reestablish care; he used to see one of FNPs but has been lost to follow up for a couple of years d/t loss of insurance.   He has not been on any of his meds since that time.  He recently got a new job and insurance.  His coworkers have been telling that he looks flushed and sent him to the clinic at work where is BP was elevated.  He did labs recently.  He has a prior h/o LINDA which improved after he had UPPP.  However, he has gained a significant amount of weight since then.  His family has reported to him that he snores loudly and he wakes up choking and gasping.  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  [place] score 20.  He had a CT of his head last year following a fall and was told he might have a small aneurysm; has not had f/u CTA.      Hypertension   This is a chronic problem. The problem is uncontrolled. Associated symptoms include malaise/fatigue, neck pain and shortness of breath (occasional). Pertinent negatives include no chest pain, headaches, palpitations or peripheral edema. Past treatments include ACE inhibitors. Compliance problems include medication cost.    Hyperlipidemia   This is a chronic problem. The problem is uncontrolled. Recent lipid tests were reviewed and are high. Associated symptoms include shortness of breath (occasional). Pertinent negatives include no chest pain or myalgias. Risk factors for coronary artery disease include obesity, male sex, hypertension, dyslipidemia and family history.     Review of Systems   Constitutional: Positive for fatigue and malaise/fatigue. Negative for chills, fever and unexpected weight change.   HENT: Positive for sinus  pressure. Negative for hearing loss, postnasal drip, rhinorrhea, trouble swallowing and voice change. Congestion: nasal.    Eyes: Negative for photophobia and visual disturbance.   Respiratory: Positive for apnea and shortness of breath (occasional). Negative for cough, choking, chest tightness and wheezing.    Cardiovascular: Negative for chest pain, palpitations and leg swelling.   Gastrointestinal: Negative for abdominal pain, blood in stool, constipation, diarrhea, nausea, rectal pain and vomiting.   Endocrine: Negative for cold intolerance, heat intolerance, polydipsia and polyuria.   Genitourinary: Positive for decreased urine volume (incomplete emptying) and difficulty urinating (slow urine stream). Negative for discharge, dysuria, flank pain, frequency, genital sores, hematuria, testicular pain and urgency.   Musculoskeletal: Positive for back pain, neck pain and neck stiffness. Negative for arthralgias, gait problem, joint swelling and myalgias.   Skin: Positive for color change (red face). Negative for rash and wound.   Allergic/Immunologic: Negative for environmental allergies and food allergies.   Neurological: Positive for weakness. Negative for dizziness, tremors, seizures, syncope, facial asymmetry, speech difficulty, light-headedness, numbness and headaches.   Hematological: Negative for adenopathy. Does not bruise/bleed easily.   Psychiatric/Behavioral: Positive for sleep disturbance. Negative for confusion, hallucinations and suicidal ideas. The patient is nervous/anxious.        Past Medical History:   Diagnosis Date    Anxiety     Depression     Eczema 10/30/2013    Hyperlipidemia LDL goal < 130     Hypertension     takes Metoprolol, also helps with palpitations    Recurrent upper respiratory infection (URI)     Sleep apnea     resolved with UPPP      Past Surgical History:   Procedure Laterality Date    ADENOIDECTOMY      ANAL FISTULOTOMY      CARPAL TUNNEL RELEASE Bilateral      ROTATOR CUFF REPAIR  2011    right    SINUS SURGERY  2011    TONSILLECTOMY      UVULOPALATOPHARYNGOPLASTY  2011       Family History   Problem Relation Age of Onset    Hypertension Mother     Heart disease Mother 55        CAD, CABG    Diabetes Mother     Heart attack Mother     Arthritis Mother     Depression Mother     Hypertension Father     Arthritis Father     Kidney disease Father     Depression Father     Hearing loss Father     Other Father         elevated PSA but negative serial biopsies    Allergic rhinitis Sister     Allergies Sister     Angioedema Sister     Asthma Neg Hx     Eczema Neg Hx     Immunodeficiency Neg Hx        Social History     Socioeconomic History    Marital status: Single     Spouse name: Not on file    Number of children: Not on file    Years of education: Not on file    Highest education level: Not on file   Occupational History    Occupation: horticuturalist     Employer: [employer]   Social Needs    Financial resource strain: Not on file    Food insecurity:     Worry: Not on file     Inability: Not on file    Transportation needs:     Medical: Not on file     Non-medical: Not on file   Tobacco Use    Smoking status: Never Smoker    Smokeless tobacco: Never Used   Substance and Sexual Activity    Alcohol use: Yes     Frequency: Monthly or less     Drinks per session: 1 or 2    Drug use: Not Currently     Types: Benzodiazepines, Hydrocodone    Sexual activity: Never   Lifestyle    Physical activity:     Days per week: Not on file     Minutes per session: Not on file    Stress: Very much   Relationships    Social connections:     Talks on phone: Not on file     Gets together: Not on file     Attends [employer] service: Not on file     Active member of club or organization: Not on file     Attends meetings of clubs or organizations: Not on file     Relationship status: Not on file   Other Topics Concern    Not on file   Social History  "Narrative    Live alone       Current Outpatient Medications   Medication Sig Dispense Refill    atorvastatin (LIPITOR) 20 MG tablet TAKE 1 TABLET(20 MG) BY MOUTH EVERY EVENING 90 tablet 1    lisinopril (PRINIVIL,ZESTRIL) 20 MG tablet TAKE 1 TABLET(20 MG) BY MOUTH EVERY DAY 90 tablet 1    traZODone (DESYREL) 50 MG tablet Take 1 tablet (50 mg total) by mouth every evening. 90 tablet 1     No current facility-administered medications for this visit.        Review of patient's allergies indicates:   Allergen Reactions    Levofloxacin Shortness Of Breath and Hives    Quinolones Hives and Shortness Of Breath     Objective:    HPI     Hypertension      Additional comments: refill medication              Follow-up      Additional comments: followup labs              Insomnia      Additional comments: difficult sleep pattern          Last edited by Raul Tolliver MA on 12/2/2019  3:03 PM. (History)      Blood pressure (!) 140/78, pulse 102, temperature 98.4 °F (36.9 °C), temperature source Temporal, height 5' 7.5" (1.715 m), weight 113.9 kg (251 lb), SpO2 98 %. Body mass index is 38.73 kg/m².   Physical Exam   Constitutional: He appears well-developed. No distress.   Obese     HENT:   Nose: Nose normal.   Mouth/Throat: Oropharynx is clear and moist.   Uvula absent but looks like Mallampati class 3   Eyes: Conjunctivae are normal. Right eye exhibits no discharge. Left eye exhibits no discharge. No scleral icterus.   Neck: Carotid bruit is not present.   19 inch circumference     Cardiovascular: Normal rate, regular rhythm and normal heart sounds.   No murmur heard.  Pulmonary/Chest: Effort normal and breath sounds normal. No respiratory distress. He has no decreased breath sounds. He has no wheezes. He has no rhonchi. He has no rales.   Abdominal: Soft. He exhibits no distension. There is no tenderness. There is no rebound and no guarding.   Musculoskeletal: He exhibits no edema.   Neurological: He is alert. He " displays no tremor.   Skin: Skin is warm and dry.   Psychiatric: He has a normal mood and affect. His speech is normal.   Nursing note and vitals reviewed.          Assessment:       1. Essential hypertension    2. Dyslipidemia    3. Shift work sleep disorder    4. Obstructive sleep apnea syndrome    5. Mixed anxiety depressive disorder    6. Abnormal CT of brain    7. Impaired fasting glucose        Plan:       Jai was seen today for hypertension, hyperlipidemia, follow-up, insomnia and fall.    Diagnoses and all orders for this visit:    Essential hypertension  -     lisinopril (PRINIVIL,ZESTRIL) 20 MG tablet; TAKE 1 TABLET(20 MG) BY MOUTH EVERY DAY    Dyslipidemia  -     atorvastatin (LIPITOR) 20 MG tablet; TAKE 1 TABLET(20 MG) BY MOUTH EVERY EVENING    Shift work sleep disorder  -     traZODone (DESYREL) 50 MG tablet; Take 1 tablet (50 mg total) by mouth every evening.    Obstructive sleep apnea syndrome  -     Home Sleep Studies; Future    Mixed anxiety depressive disorder  Comments:  He feels like he is doing fine without meds    Abnormal CT of brain  -     CTA Head; Future    Impaired fasting glucose

## 2020-03-04 ENCOUNTER — OFFICE VISIT (OUTPATIENT)
Dept: FAMILY MEDICINE | Facility: CLINIC | Age: 45
End: 2020-03-04
Payer: MEDICAID

## 2020-03-04 VITALS
WEIGHT: 249 LBS | HEIGHT: 68 IN | HEART RATE: 74 BPM | BODY MASS INDEX: 37.74 KG/M2 | DIASTOLIC BLOOD PRESSURE: 90 MMHG | SYSTOLIC BLOOD PRESSURE: 144 MMHG | OXYGEN SATURATION: 95 %

## 2020-03-04 DIAGNOSIS — G47.00 INSOMNIA, UNSPECIFIED TYPE: ICD-10-CM

## 2020-03-04 DIAGNOSIS — K64.4 EXTERNAL HEMORRHOID: ICD-10-CM

## 2020-03-04 DIAGNOSIS — E78.5 DYSLIPIDEMIA: ICD-10-CM

## 2020-03-04 DIAGNOSIS — I10 ESSENTIAL HYPERTENSION: Primary | ICD-10-CM

## 2020-03-04 PROCEDURE — 99213 OFFICE O/P EST LOW 20 MIN: CPT | Mod: S$GLB,,, | Performed by: INTERNAL MEDICINE

## 2020-03-04 PROCEDURE — 99213 PR OFFICE/OUTPT VISIT, EST, LEVL III, 20-29 MIN: ICD-10-PCS | Mod: S$GLB,,, | Performed by: INTERNAL MEDICINE

## 2020-03-04 RX ORDER — TRAZODONE HYDROCHLORIDE 50 MG/1
50 TABLET ORAL NIGHTLY
Qty: 90 TABLET | Refills: 1 | Status: SHIPPED | OUTPATIENT
Start: 2020-03-04 | End: 2021-03-15

## 2020-03-04 RX ORDER — LISINOPRIL AND HYDROCHLOROTHIAZIDE 12.5; 2 MG/1; MG/1
1 TABLET ORAL DAILY
Qty: 90 TABLET | Refills: 1 | Status: SHIPPED | OUTPATIENT
Start: 2020-03-04 | End: 2020-08-05 | Stop reason: SDUPTHER

## 2020-03-04 RX ORDER — ATORVASTATIN CALCIUM 20 MG/1
TABLET, FILM COATED ORAL
Qty: 90 TABLET | Refills: 1 | Status: SHIPPED | OUTPATIENT
Start: 2020-03-04 | End: 2020-08-05 | Stop reason: SDUPTHER

## 2020-03-04 RX ORDER — HYDROCORTISONE 25 MG/G
CREAM TOPICAL 2 TIMES DAILY
Qty: 28 G | Refills: 1 | Status: SHIPPED | OUTPATIENT
Start: 2020-03-04

## 2020-03-04 NOTE — PROGRESS NOTES
Subjective:       Patient ID: Jai Watkins III is a 44 y.o. male.    Chief Complaint: Hypertension (3m f/u)    Here for follow up and med refills.  He reports compliance with lisinopril but hasn't been taking atorvastatin d/t cost; he was laid off and lost his insurance.  He did not get CTA or sleep study done.  Needs refill on med for hemorrhoid.    Review of Systems   Constitutional: Negative for chills, fatigue, fever and unexpected weight change.   HENT: Positive for congestion. Negative for hearing loss, postnasal drip, rhinorrhea, trouble swallowing and voice change.    Eyes: Negative for photophobia and visual disturbance.   Respiratory: Negative for apnea, cough, choking, chest tightness, shortness of breath and wheezing.    Cardiovascular: Negative for chest pain, palpitations and leg swelling.   Gastrointestinal: Negative for abdominal pain, blood in stool, constipation, diarrhea, nausea, rectal pain and vomiting.   Endocrine: Negative for cold intolerance, heat intolerance, polydipsia and polyuria.   Genitourinary: Negative for decreased urine volume, difficulty urinating, discharge, dysuria, flank pain, frequency, genital sores, hematuria, testicular pain and urgency.   Musculoskeletal: Positive for neck pain. Negative for arthralgias, back pain, gait problem, joint swelling and myalgias.   Skin: Negative for color change, rash and wound.   Allergic/Immunologic: Negative for environmental allergies and food allergies.   Neurological: Negative for dizziness, tremors, seizures, syncope, facial asymmetry, speech difficulty, weakness, light-headedness, numbness and headaches.   Hematological: Negative for adenopathy. Does not bruise/bleed easily.   Psychiatric/Behavioral: Positive for sleep disturbance. Negative for confusion, hallucinations and suicidal ideas. The patient is not nervous/anxious.        Past Medical History:   Diagnosis Date    Anxiety     Depression     Eczema 10/30/2013     Hyperlipidemia LDL goal < 130     Hypertension     takes Metoprolol, also helps with palpitations    Recurrent upper respiratory infection (URI)     Sleep apnea     resolved with UPPP      Past Surgical History:   Procedure Laterality Date    ADENOIDECTOMY      ANAL FISTULOTOMY      CARPAL TUNNEL RELEASE Bilateral     ROTATOR CUFF REPAIR  2011    right    SINUS SURGERY  2011    TONSILLECTOMY      UVULOPALATOPHARYNGOPLASTY  2011       Family History   Problem Relation Age of Onset    Hypertension Mother     Heart disease Mother 55        CAD, CABG    Diabetes Mother     Heart attack Mother     Arthritis Mother     Depression Mother     Hypertension Father     Arthritis Father     Kidney disease Father     Depression Father     Hearing loss Father     Other Father         elevated PSA but negative serial biopsies    Allergic rhinitis Sister     Allergies Sister     Angioedema Sister     Asthma Neg Hx     Eczema Neg Hx     Immunodeficiency Neg Hx        Social History     Socioeconomic History    Marital status: Single     Spouse name: Not on file    Number of children: Not on file    Years of education: Not on file    Highest education level: Not on file   Occupational History    Occupation: horticuturalist     Employer: [employer]   Social Needs    Financial resource strain: Not on file    Food insecurity:     Worry: Not on file     Inability: Not on file    Transportation needs:     Medical: Not on file     Non-medical: Not on file   Tobacco Use    Smoking status: Never Smoker    Smokeless tobacco: Never Used   Substance and Sexual Activity    Alcohol use: Yes     Frequency: Monthly or less     Drinks per session: 1 or 2    Drug use: Not Currently     Types: Benzodiazepines, Hydrocodone    Sexual activity: Never   Lifestyle    Physical activity:     Days per week: Not on file     Minutes per session: Not on file    Stress: Very much   Relationships    Social  "connections:     Talks on phone: Not on file     Gets together: Not on file     Attends [employer] service: Not on file     Active member of club or organization: Not on file     Attends meetings of clubs or organizations: Not on file     Relationship status: Not on file   Other Topics Concern    Not on file   Social History Narrative    Live alone       Current Outpatient Medications   Medication Sig Dispense Refill    atorvastatin (LIPITOR) 20 MG tablet TAKE 1 TABLET(20 MG) BY MOUTH EVERY EVENING 90 tablet 1    hydrocortisone (ANUSOL-HC) 2.5 % rectal cream Place rectally 2 (two) times daily. 28 g 1    lisinopril-hydrochlorothiazide (PRINZIDE,ZESTORETIC) 20-12.5 mg per tablet Take 1 tablet by mouth once daily. 90 tablet 1    traZODone (DESYREL) 50 MG tablet Take 1 tablet (50 mg total) by mouth every evening. 90 tablet 1     No current facility-administered medications for this visit.        Review of patient's allergies indicates:   Allergen Reactions    Levofloxacin Shortness Of Breath and Hives    Quinolones Hives and Shortness Of Breath     Objective:    HPI     Hypertension      Additional comments: 3m f/u          Last edited by Mariza Beck LPN on 3/4/2020  9:48 AM. (History)      Blood pressure (!) 144/90, pulse 74, height 5' 7.5" (1.715 m), weight 112.9 kg (249 lb), SpO2 95 %. Body mass index is 38.42 kg/m².   Physical Exam        Assessment:       1. Essential hypertension    2. Dyslipidemia    3. Insomnia, unspecified type    4. External hemorrhoid        Plan:       Jai was seen today for hypertension.    Diagnoses and all orders for this visit:    Essential hypertension  -     lisinopril-hydrochlorothiazide (PRINZIDE,ZESTORETIC) 20-12.5 mg per tablet; Take 1 tablet by mouth once daily.    Dyslipidemia  Comments:  Can get 90 days for $12 at [facility] with [employer]  Orders:  -     atorvastatin (LIPITOR) 20 MG tablet; TAKE 1 TABLET(20 MG) BY MOUTH EVERY EVENING    Insomnia, unspecified type  -   "   traZODone (DESYREL) 50 MG tablet; Take 1 tablet (50 mg total) by mouth every evening.    External hemorrhoid  -     hydrocortisone (ANUSOL-HC) 2.5 % rectal cream; Place rectally 2 (two) times daily.

## 2020-06-24 ENCOUNTER — OFFICE VISIT (OUTPATIENT)
Dept: FAMILY MEDICINE | Facility: CLINIC | Age: 45
End: 2020-06-24
Payer: MEDICAID

## 2020-06-24 VITALS
TEMPERATURE: 97 F | SYSTOLIC BLOOD PRESSURE: 114 MMHG | HEIGHT: 68 IN | DIASTOLIC BLOOD PRESSURE: 80 MMHG | HEART RATE: 84 BPM | BODY MASS INDEX: 38.49 KG/M2 | OXYGEN SATURATION: 98 % | WEIGHT: 254 LBS

## 2020-06-24 DIAGNOSIS — R21 RASH OF BACK: Primary | ICD-10-CM

## 2020-06-24 DIAGNOSIS — R90.89 ABNORMAL CT OF BRAIN: ICD-10-CM

## 2020-06-24 PROCEDURE — 99213 PR OFFICE/OUTPT VISIT, EST, LEVL III, 20-29 MIN: ICD-10-PCS | Mod: S$GLB,,, | Performed by: INTERNAL MEDICINE

## 2020-06-24 PROCEDURE — 99213 OFFICE O/P EST LOW 20 MIN: CPT | Mod: S$GLB,,, | Performed by: INTERNAL MEDICINE

## 2020-06-24 RX ORDER — VALACYCLOVIR HYDROCHLORIDE 1 G/1
1000 TABLET, FILM COATED ORAL 3 TIMES DAILY
Qty: 21 TABLET | Refills: 0 | Status: SHIPPED | OUTPATIENT
Start: 2020-06-24 | End: 2020-08-05

## 2020-06-24 RX ORDER — BETAMETHASONE DIPROPIONATE 0.5 MG/G
CREAM TOPICAL 2 TIMES DAILY
Qty: 15 G | Refills: 1 | Status: SHIPPED | OUTPATIENT
Start: 2020-06-24

## 2020-06-24 NOTE — PROGRESS NOTES
Subjective:       Patient ID: Jai Watkins III is a 44 y.o. male.    Chief Complaint: Rash (shingles 9(ack))    Rash  The current episode started 1 to 4 weeks ago. The problem has been waxing and waning (he reports he initially has blisters on his left arm and went to urgent care and was diagnosed with shingles; started with rash on his back 2 days ago which feels the same as the one on his arm) since onset. The affected locations include the left arm and back. The rash is characterized by blistering, pain, redness, burning and itchiness. Associated with: no new medications, soap/detergent,, outoor exposures. Associated symptoms include congestion and coughing (in the morning). Pertinent negatives include no diarrhea, fatigue, fever, rhinorrhea, shortness of breath or vomiting. Past treatments include antibiotic cream (valtrex).     Review of Systems   Constitutional: Positive for unexpected weight change (wt. gain). Negative for chills, fatigue and fever.   HENT: Positive for congestion and postnasal drip. Negative for hearing loss, rhinorrhea, trouble swallowing and voice change.    Eyes: Negative for photophobia and visual disturbance.   Respiratory: Positive for apnea and cough (in the morning). Negative for choking, chest tightness, shortness of breath and wheezing.    Cardiovascular: Negative for chest pain, palpitations and leg swelling.   Gastrointestinal: Negative for abdominal pain, blood in stool, constipation, diarrhea, nausea, rectal pain and vomiting.   Endocrine: Negative for cold intolerance, heat intolerance, polydipsia and polyuria.   Genitourinary: Negative for decreased urine volume, difficulty urinating, discharge, dysuria, flank pain, frequency, genital sores, hematuria, testicular pain and urgency.   Musculoskeletal: Positive for back pain and neck pain. Negative for arthralgias, gait problem, joint swelling and myalgias.   Skin: Positive for rash. Negative for color change and wound.    Allergic/Immunologic: Negative for environmental allergies and food allergies.   Neurological: Negative for dizziness, tremors, seizures, syncope, facial asymmetry, speech difficulty, weakness, light-headedness, numbness and headaches.   Hematological: Negative for adenopathy. Does not bruise/bleed easily.   Psychiatric/Behavioral: Positive for sleep disturbance. Negative for confusion, hallucinations and suicidal ideas. The patient is nervous/anxious.        Past Medical History:   Diagnosis Date    Anxiety     Depression     Eczema 10/30/2013    Hyperlipidemia LDL goal < 130     Hypertension     takes Metoprolol, also helps with palpitations    Recurrent upper respiratory infection (URI)     Sleep apnea     resolved with UPPP      Past Surgical History:   Procedure Laterality Date    ADENOIDECTOMY      ANAL FISTULOTOMY      CARPAL TUNNEL RELEASE Bilateral     ROTATOR CUFF REPAIR  2011    right    SINUS SURGERY  2011    TONSILLECTOMY      UVULOPALATOPHARYNGOPLASTY  2011       Family History   Problem Relation Age of Onset    Hypertension Mother     Heart disease Mother 55        CAD, CABG    Diabetes Mother     Heart attack Mother     Arthritis Mother     Depression Mother     Hypertension Father     Arthritis Father     Kidney disease Father     Depression Father     Hearing loss Father     Other Father         elevated PSA but negative serial biopsies    Allergic rhinitis Sister     Allergies Sister     Angioedema Sister     Asthma Neg Hx     Eczema Neg Hx     Immunodeficiency Neg Hx        Social History     Socioeconomic History    Marital status: Single     Spouse name: Not on file    Number of children: Not on file    Years of education: Not on file    Highest education level: Not on file   Occupational History    Occupation: horticuturalist     Employer: [employer]   Social Needs    Financial resource strain: Not on file    Food insecurity     Worry: Not  on file     Inability: Not on file    Transportation needs     Medical: Not on file     Non-medical: Not on file   Tobacco Use    Smoking status: Never Smoker    Smokeless tobacco: Never Used   Substance and Sexual Activity    Alcohol use: Yes     Frequency: Monthly or less     Drinks per session: 1 or 2    Drug use: Not Currently     Types: Benzodiazepines, Hydrocodone    Sexual activity: Never   Lifestyle    Physical activity     Days per week: Not on file     Minutes per session: Not on file    Stress: Very much   Relationships    Social connections     Talks on phone: Not on file     Gets together: Not on file     Attends Yazidi service: Not on file     Active member of club or organization: Not on file     Attends meetings of clubs or organizations: Not on file     Relationship status: Not on file   Other Topics Concern    Not on file   Social History Narrative    Live alone       Current Outpatient Medications   Medication Sig Dispense Refill    atorvastatin (LIPITOR) 20 MG tablet TAKE 1 TABLET(20 MG) BY MOUTH EVERY EVENING 90 tablet 1    hydrocortisone (ANUSOL-HC) 2.5 % rectal cream Place rectally 2 (two) times daily. 28 g 1    lisinopril-hydrochlorothiazide (PRINZIDE,ZESTORETIC) 20-12.5 mg per tablet Take 1 tablet by mouth once daily. 90 tablet 1    traZODone (DESYREL) 50 MG tablet Take 1 tablet (50 mg total) by mouth every evening. 90 tablet 1    augmented betamethasone dipropionate (DIPROLENE-AF) 0.05 % cream Apply topically 2 (two) times daily. 15 g 1    valACYclovir (VALTREX) 1000 MG tablet Take 1 tablet (1,000 mg total) by mouth 3 (three) times daily. for 7 days 21 tablet 0     No current facility-administered medications for this visit.        Review of patient's allergies indicates:   Allergen Reactions    Levofloxacin Shortness Of Breath and Hives    Quinolones Hives and Shortness Of Breath     Objective:    HPI     Rash      Additional comments: shingles 9(ack)          Last  "edited by Raul Tolliver MA on 6/24/2020  1:05 PM. (History)      Blood pressure 114/80, pulse 84, temperature 97.2 °F (36.2 °C), temperature source Temporal, height 5' 7.5" (1.715 m), weight 115.2 kg (254 lb), SpO2 98 %. Body mass index is 39.19 kg/m².   Physical Exam  Vitals signs and nursing note reviewed.   Constitutional:       General: He is not in acute distress.     Appearance: He is obese. He is not ill-appearing, toxic-appearing or diaphoretic.   HENT:      Head: Normocephalic and atraumatic.   Eyes:      General: No scleral icterus.        Right eye: No discharge.         Left eye: No discharge.   Cardiovascular:      Rate and Rhythm: Normal rate and regular rhythm.      Heart sounds: Normal heart sounds.   Pulmonary:      Effort: Pulmonary effort is normal. No respiratory distress.      Breath sounds: Normal breath sounds.   Abdominal:      Palpations: Abdomen is soft.      Tenderness: There is no abdominal tenderness.   Skin:     Findings: Rash (linear area of erythema left upper back just posterior to axilla with a couple of erosions c/w ruptured vesicles) present.             Assessment:       1. Rash of back        Plan:       Jai was seen today for rash.    Diagnoses and all orders for this visit:    Rash of back  Comments:  Shingles vs bullous lesion  Orders:  -     valACYclovir (VALTREX) 1000 MG tablet; Take 1 tablet (1,000 mg total) by mouth 3 (three) times daily. for 7 days  -     augmented betamethasone dipropionate (DIPROLENE-AF) 0.05 % cream; Apply topically 2 (two) times daily.           "

## 2020-07-01 ENCOUNTER — HOSPITAL ENCOUNTER (OUTPATIENT)
Dept: RADIOLOGY | Facility: HOSPITAL | Age: 45
Discharge: HOME | End: 2020-07-01
Attending: INTERNAL MEDICINE
Payer: MEDICAID

## 2020-07-01 DIAGNOSIS — R90.89 ABNORMAL CT OF BRAIN: ICD-10-CM

## 2020-07-01 PROCEDURE — 25500020 PHARM REV CODE 255: Performed by: INTERNAL MEDICINE

## 2020-07-01 PROCEDURE — 70496 CT ANGIOGRAPHY HEAD: CPT | Mod: TC

## 2020-07-01 RX ADMIN — IOHEXOL 100 ML: 350 INJECTION, SOLUTION INTRAVENOUS at 08:07

## 2020-08-05 ENCOUNTER — OFFICE VISIT (OUTPATIENT)
Dept: FAMILY MEDICINE | Facility: CLINIC | Age: 45
End: 2020-08-05
Payer: COMMERCIAL

## 2020-08-05 VITALS
HEART RATE: 81 BPM | BODY MASS INDEX: 38.65 KG/M2 | OXYGEN SATURATION: 96 % | WEIGHT: 255 LBS | TEMPERATURE: 98 F | HEIGHT: 68 IN | DIASTOLIC BLOOD PRESSURE: 70 MMHG | SYSTOLIC BLOOD PRESSURE: 114 MMHG

## 2020-08-05 DIAGNOSIS — E78.5 DYSLIPIDEMIA: ICD-10-CM

## 2020-08-05 DIAGNOSIS — R73.01 IMPAIRED FASTING GLUCOSE: ICD-10-CM

## 2020-08-05 DIAGNOSIS — I10 ESSENTIAL HYPERTENSION: Primary | ICD-10-CM

## 2020-08-05 DIAGNOSIS — G47.33 OBSTRUCTIVE SLEEP APNEA SYNDROME: ICD-10-CM

## 2020-08-05 DIAGNOSIS — G47.26 SHIFT WORK SLEEP DISORDER: ICD-10-CM

## 2020-08-05 PROCEDURE — 99214 PR OFFICE/OUTPT VISIT, EST, LEVL IV, 30-39 MIN: ICD-10-PCS | Mod: S$GLB,,, | Performed by: INTERNAL MEDICINE

## 2020-08-05 PROCEDURE — 3074F SYST BP LT 130 MM HG: CPT | Mod: S$GLB,,, | Performed by: INTERNAL MEDICINE

## 2020-08-05 PROCEDURE — 3008F BODY MASS INDEX DOCD: CPT | Mod: S$GLB,,, | Performed by: INTERNAL MEDICINE

## 2020-08-05 PROCEDURE — 3078F DIAST BP <80 MM HG: CPT | Mod: S$GLB,,, | Performed by: INTERNAL MEDICINE

## 2020-08-05 PROCEDURE — 3078F PR MOST RECENT DIASTOLIC BLOOD PRESSURE < 80 MM HG: ICD-10-PCS | Mod: S$GLB,,, | Performed by: INTERNAL MEDICINE

## 2020-08-05 PROCEDURE — 3008F PR BODY MASS INDEX (BMI) DOCUMENTED: ICD-10-PCS | Mod: S$GLB,,, | Performed by: INTERNAL MEDICINE

## 2020-08-05 PROCEDURE — 99214 OFFICE O/P EST MOD 30 MIN: CPT | Mod: S$GLB,,, | Performed by: INTERNAL MEDICINE

## 2020-08-05 PROCEDURE — 3074F PR MOST RECENT SYSTOLIC BLOOD PRESSURE < 130 MM HG: ICD-10-PCS | Mod: S$GLB,,, | Performed by: INTERNAL MEDICINE

## 2020-08-05 RX ORDER — ATORVASTATIN CALCIUM 20 MG/1
TABLET, FILM COATED ORAL
Qty: 90 TABLET | Refills: 1 | Status: SHIPPED | OUTPATIENT
Start: 2020-08-05 | End: 2021-03-08 | Stop reason: SDUPTHER

## 2020-08-05 RX ORDER — OMEPRAZOLE 20 MG/1
20 TABLET, DELAYED RELEASE ORAL DAILY
COMMUNITY
End: 2021-10-15 | Stop reason: SDUPTHER

## 2020-08-05 RX ORDER — LISINOPRIL AND HYDROCHLOROTHIAZIDE 12.5; 2 MG/1; MG/1
1 TABLET ORAL DAILY
Qty: 90 TABLET | Refills: 1 | Status: SHIPPED | OUTPATIENT
Start: 2020-08-05 | End: 2021-03-08 | Stop reason: SDUPTHER

## 2020-08-05 NOTE — PROGRESS NOTES
Subjective:       Patient ID: Jai Watkins III is a 45 y.o. male.    Chief Complaint: Hypertension (5 months followup) and Hyperlipidemia    Here for routine follow up.  He did his CTA which was negative.   He is having some issues with sleep patterns.  He is back to working 12 hour shifts and has a hard time getting to sleep, especially when he is working nights and has to sleep during the day.  Often only getting 3-4 hours of sleep and is really fatigued during the day; sometimes nods off when driving.  He has trazodone but hesitates to take it because he is concerned about sleeping through alarm clock which has happened before.  He has had surgery in the past for LINDA.  His daughter reports that he still chokes/gasps when sleeping at times.   I had ordered a sleep study previously but he couldn't afford.     Hypertension  This is a chronic problem. The problem is controlled. Associated symptoms include anxiety. Pertinent negatives include no chest pain, headaches, malaise/fatigue, neck pain, palpitations, peripheral edema or shortness of breath. Past treatments include ACE inhibitors and diuretics. The current treatment provides significant improvement. There are no compliance problems.    Hyperlipidemia  This is a chronic problem. The problem is uncontrolled. Recent lipid tests were reviewed and are high. Exacerbating diseases include obesity. Pertinent negatives include no chest pain, myalgias or shortness of breath. Current antihyperlipidemic treatment includes statins. Risk factors for coronary artery disease include obesity, male sex, hypertension, dyslipidemia and family history.     Review of Systems   Constitutional: Negative for chills, fatigue, fever, malaise/fatigue and unexpected weight change.   HENT: Positive for congestion (in AM). Negative for hearing loss, postnasal drip, rhinorrhea, trouble swallowing and voice change.    Eyes: Negative for photophobia and visual disturbance.   Respiratory:  Positive for apnea and cough (in the morning). Negative for choking, chest tightness, shortness of breath and wheezing.    Cardiovascular: Negative for chest pain, palpitations and leg swelling.   Gastrointestinal: Negative for abdominal pain, blood in stool, constipation, diarrhea, nausea, rectal pain and vomiting.   Endocrine: Negative for cold intolerance, heat intolerance, polydipsia and polyuria.   Genitourinary: Negative for decreased urine volume, difficulty urinating, discharge, dysuria, flank pain, frequency, genital sores, hematuria, testicular pain and urgency.   Musculoskeletal: Negative for arthralgias, back pain, gait problem, joint swelling, myalgias and neck pain.   Skin: Negative for color change, rash and wound.   Allergic/Immunologic: Negative for environmental allergies and food allergies.   Neurological: Negative for dizziness, tremors, seizures, syncope, facial asymmetry, speech difficulty, weakness, light-headedness, numbness and headaches.   Hematological: Negative for adenopathy. Does not bruise/bleed easily.   Psychiatric/Behavioral: Positive for sleep disturbance. Negative for confusion, hallucinations and suicidal ideas. The patient is nervous/anxious.        Past Medical History:   Diagnosis Date    Anxiety     Depression     Eczema 10/30/2013    Hyperlipidemia LDL goal < 130     Hypertension     takes Metoprolol, also helps with palpitations    Recurrent upper respiratory infection (URI)     Sleep apnea     resolved with UPPP      Past Surgical History:   Procedure Laterality Date    ADENOIDECTOMY      ANAL FISTULOTOMY      CARPAL TUNNEL RELEASE Bilateral     ROTATOR CUFF REPAIR  2011    right    SINUS SURGERY  2011    TONSILLECTOMY      UVULOPALATOPHARYNGOPLASTY  2011       Family History   Problem Relation Age of Onset    Hypertension Mother     Heart disease Mother 55        CAD, CABG    Diabetes Mother     Heart attack Mother     Arthritis Mother     Depression  Mother     Hypertension Father     Arthritis Father     Kidney disease Father     Depression Father     Hearing loss Father     Other Father         elevated PSA but negative serial biopsies    Allergic rhinitis Sister     Allergies Sister     Angioedema Sister     Asthma Neg Hx     Eczema Neg Hx     Immunodeficiency Neg Hx        Social History     Socioeconomic History    Marital status: Single     Spouse name: Not on file    Number of children: Not on file    Years of education: Not on file    Highest education level: Not on file   Occupational History    Occupation: horticuturalist     Employer: [employer]   Social Needs    Financial resource strain: Not on file    Food insecurity     Worry: Not on file     Inability: Not on file    Transportation needs     Medical: Not on file     Non-medical: Not on file   Tobacco Use    Smoking status: Never Smoker    Smokeless tobacco: Never Used   Substance and Sexual Activity    Alcohol use: Yes     Frequency: Monthly or less     Drinks per session: 1 or 2    Drug use: Not Currently     Types: Benzodiazepines, Hydrocodone    Sexual activity: Never   Lifestyle    Physical activity     Days per week: Not on file     Minutes per session: Not on file    Stress: Very much   Relationships    Social connections     Talks on phone: Not on file     Gets together: Not on file     Attends Gnosticism service: Not on file     Active member of club or organization: Not on file     Attends meetings of clubs or organizations: Not on file     Relationship status: Not on file   Other Topics Concern    Not on file   Social History Narrative    Live alone       Current Outpatient Medications   Medication Sig Dispense Refill    atorvastatin (LIPITOR) 20 MG tablet TAKE 1 TABLET(20 MG) BY MOUTH EVERY EVENING 90 tablet 1    augmented betamethasone dipropionate (DIPROLENE-AF) 0.05 % cream Apply topically 2 (two) times daily. 15 g 1    hydrocortisone  "(ANUSOL-HC) 2.5 % rectal cream Place rectally 2 (two) times daily. 28 g 1    lisinopriL-hydrochlorothiazide (PRINZIDE,ZESTORETIC) 20-12.5 mg per tablet Take 1 tablet by mouth once daily. 90 tablet 1    traZODone (DESYREL) 50 MG tablet Take 1 tablet (50 mg total) by mouth every evening. 90 tablet 1     No current facility-administered medications for this visit.        Review of patient's allergies indicates:   Allergen Reactions    Levofloxacin Shortness Of Breath and Hives    Quinolones Hives and Shortness Of Breath     Objective:    HPI     Hypertension      Additional comments: 5 months followup          Last edited by Raul Tolliver MA on 8/5/2020  8:27 AM. (History)      Blood pressure 114/70, pulse 81, temperature 97.9 °F (36.6 °C), height 5' 7.5" (1.715 m), weight 115.7 kg (255 lb), SpO2 96 %. Body mass index is 39.35 kg/m².   Physical Exam  Vitals signs and nursing note reviewed.   Constitutional:       General: He is not in acute distress.     Appearance: He is well-developed. He is obese. He is not ill-appearing, toxic-appearing or diaphoretic.   HENT:      Head: Normocephalic and atraumatic.   Eyes:      General: No scleral icterus.        Right eye: No discharge.         Left eye: No discharge.      Conjunctiva/sclera: Conjunctivae normal.   Neck:      Vascular: No carotid bruit.   Cardiovascular:      Rate and Rhythm: Normal rate and regular rhythm.      Heart sounds: Normal heart sounds. No murmur.   Pulmonary:      Effort: Pulmonary effort is normal. No respiratory distress.      Breath sounds: Normal breath sounds. No decreased breath sounds, wheezing, rhonchi or rales.   Abdominal:      General: There is no distension.      Palpations: Abdomen is soft.      Tenderness: There is no abdominal tenderness. There is no guarding or rebound.   Musculoskeletal:      Right lower leg: No edema.      Left lower leg: No edema.   Skin:     General: Skin is warm and dry.   Neurological:      Mental Status: " He is alert.      Motor: No tremor.   Psychiatric:         Mood and Affect: Mood normal.         Speech: Speech normal.         Behavior: Behavior normal.             Assessment:       1. Essential hypertension    2. Dyslipidemia    3. Shift work sleep disorder    4. Obstructive sleep apnea syndrome    5. Impaired fasting glucose    6. Dyslipidemia        Plan:       Jai was seen today for hypertension and hyperlipidemia.    Diagnoses and all orders for this visit:    Essential hypertension  -     Comprehensive metabolic panel; Future  -     Lipid Panel; Future  -     Comprehensive metabolic panel  -     Lipid Panel  -     lisinopriL-hydrochlorothiazide (PRINZIDE,ZESTORETIC) 20-12.5 mg per tablet; Take 1 tablet by mouth once daily.    Dyslipidemia  -     Comprehensive metabolic panel; Future  -     Lipid Panel; Future  -     Comprehensive metabolic panel  -     Lipid Panel  -     atorvastatin (LIPITOR) 20 MG tablet; TAKE 1 TABLET(20 MG) BY MOUTH EVERY EVENING    Shift work sleep disorder  -     Ambulatory referral/consult to Sleep Disorders; Future    Obstructive sleep apnea syndrome  -     Ambulatory referral/consult to Sleep Disorders; Future    Impaired fasting glucose  -     Hemoglobin A1C; Future  -     Hemoglobin A1C    Dyslipidemia  Comments:  Can get 90 days for $12 at [facility] with Delf[facility]  Orders:  -     Comprehensive metabolic panel; Future  -     Lipid Panel; Future  -     Comprehensive metabolic panel  -     Lipid Panel  -     atorvastatin (LIPITOR) 20 MG tablet; TAKE 1 TABLET(20 MG) BY MOUTH EVERY EVENING

## 2020-09-21 ENCOUNTER — OFFICE VISIT (OUTPATIENT)
Dept: FAMILY MEDICINE | Facility: CLINIC | Age: 45
End: 2020-09-21
Payer: COMMERCIAL

## 2020-09-21 VITALS
SYSTOLIC BLOOD PRESSURE: 120 MMHG | HEIGHT: 68 IN | OXYGEN SATURATION: 97 % | TEMPERATURE: 98 F | HEART RATE: 78 BPM | BODY MASS INDEX: 37.89 KG/M2 | WEIGHT: 250 LBS | DIASTOLIC BLOOD PRESSURE: 74 MMHG

## 2020-09-21 DIAGNOSIS — S14.3XXA BRACHIAL PLEXUS INJURY, RIGHT, INITIAL ENCOUNTER: Primary | ICD-10-CM

## 2020-09-21 DIAGNOSIS — S20.211A CONTUSION OF RIGHT CHEST WALL, INITIAL ENCOUNTER: ICD-10-CM

## 2020-09-21 PROCEDURE — 3008F PR BODY MASS INDEX (BMI) DOCUMENTED: ICD-10-PCS | Mod: S$GLB,,, | Performed by: INTERNAL MEDICINE

## 2020-09-21 PROCEDURE — 3078F PR MOST RECENT DIASTOLIC BLOOD PRESSURE < 80 MM HG: ICD-10-PCS | Mod: S$GLB,,, | Performed by: INTERNAL MEDICINE

## 2020-09-21 PROCEDURE — 3074F PR MOST RECENT SYSTOLIC BLOOD PRESSURE < 130 MM HG: ICD-10-PCS | Mod: S$GLB,,, | Performed by: INTERNAL MEDICINE

## 2020-09-21 PROCEDURE — 99213 PR OFFICE/OUTPT VISIT, EST, LEVL III, 20-29 MIN: ICD-10-PCS | Mod: 25,S$GLB,, | Performed by: INTERNAL MEDICINE

## 2020-09-21 PROCEDURE — 3074F SYST BP LT 130 MM HG: CPT | Mod: S$GLB,,, | Performed by: INTERNAL MEDICINE

## 2020-09-21 PROCEDURE — 99213 OFFICE O/P EST LOW 20 MIN: CPT | Mod: 25,S$GLB,, | Performed by: INTERNAL MEDICINE

## 2020-09-21 PROCEDURE — 3008F BODY MASS INDEX DOCD: CPT | Mod: S$GLB,,, | Performed by: INTERNAL MEDICINE

## 2020-09-21 PROCEDURE — 3078F DIAST BP <80 MM HG: CPT | Mod: S$GLB,,, | Performed by: INTERNAL MEDICINE

## 2020-09-21 PROCEDURE — 96372 THER/PROPH/DIAG INJ SC/IM: CPT | Mod: S$GLB,,, | Performed by: INTERNAL MEDICINE

## 2020-09-21 PROCEDURE — 96372 PR INJECTION,THERAP/PROPH/DIAG2ST, IM OR SUBCUT: ICD-10-PCS | Mod: S$GLB,,, | Performed by: INTERNAL MEDICINE

## 2020-09-21 RX ORDER — NAPROXEN 500 MG/1
500 TABLET ORAL 2 TIMES DAILY
Qty: 60 TABLET | Refills: 1 | Status: SHIPPED | OUTPATIENT
Start: 2020-09-21 | End: 2021-03-24

## 2020-09-21 RX ORDER — METHYLPREDNISOLONE ACETATE 80 MG/ML
80 INJECTION, SUSPENSION INTRA-ARTICULAR; INTRALESIONAL; INTRAMUSCULAR; SOFT TISSUE
Status: COMPLETED | OUTPATIENT
Start: 2020-09-21 | End: 2020-09-21

## 2020-09-21 RX ADMIN — METHYLPREDNISOLONE ACETATE 80 MG: 80 INJECTION, SUSPENSION INTRA-ARTICULAR; INTRALESIONAL; INTRAMUSCULAR; SOFT TISSUE at 03:09

## 2020-09-21 NOTE — PROGRESS NOTES
Subjective:       Patient ID: Jai Watkins III is a 45 y.o. male.    Chief Complaint: Numbness (back and right shoulder numbness) and Fall    Here for evaluation of numbness and tingling in right arm and hand.  He reports a fall over the weekend; he slipped and fell off his steps and got wedged into a tree hanging from right axilla (house is raised); all his weight was supported by his axilla.  A neighbor had to come help him get out of the tree.  Having some neck, upper back between shoulder blades, right chest wall pain.  Hurts to take a deep breath.      Review of Systems   Constitutional: Negative for chills, fatigue, fever and unexpected weight change.   HENT: Negative for congestion, hearing loss, postnasal drip, rhinorrhea, trouble swallowing and voice change.    Eyes: Negative for photophobia and visual disturbance.   Respiratory: Negative for apnea, cough, choking, chest tightness, shortness of breath and wheezing.    Cardiovascular: Negative for chest pain, palpitations and leg swelling.   Gastrointestinal: Negative for abdominal pain, blood in stool, constipation, diarrhea, nausea, rectal pain and vomiting.   Endocrine: Negative for cold intolerance, heat intolerance, polydipsia and polyuria.   Genitourinary: Negative for decreased urine volume, difficulty urinating, discharge, dysuria, flank pain, frequency, genital sores, hematuria, testicular pain and urgency.   Musculoskeletal: Positive for arthralgias, back pain, myalgias and neck stiffness. Negative for gait problem, joint swelling and neck pain.   Skin: Positive for wound (back). Negative for color change and rash.   Allergic/Immunologic: Negative for environmental allergies and food allergies.   Neurological: Negative for dizziness, tremors, seizures, syncope, facial asymmetry, speech difficulty, weakness, light-headedness, numbness and headaches.   Hematological: Negative for adenopathy. Does not bruise/bleed easily.   Psychiatric/Behavioral:  Positive for sleep disturbance. Negative for confusion, hallucinations and suicidal ideas. The patient is not nervous/anxious.        Past Medical History:   Diagnosis Date    Anxiety     Depression     Eczema 10/30/2013    Hyperlipidemia LDL goal < 130     Hypertension     takes Metoprolol, also helps with palpitations    Recurrent upper respiratory infection (URI)     Sleep apnea     resolved with UPPP      Past Surgical History:   Procedure Laterality Date    ADENOIDECTOMY      ANAL FISTULOTOMY      CARPAL TUNNEL RELEASE Bilateral     ROTATOR CUFF REPAIR  2011    right    SINUS SURGERY  2011    TONSILLECTOMY      UVULOPALATOPHARYNGOPLASTY  2011       Family History   Problem Relation Age of Onset    Hypertension Mother     Heart disease Mother 55        CAD, CABG    Diabetes Mother     Heart attack Mother     Arthritis Mother     Depression Mother     Hypertension Father     Arthritis Father     Kidney disease Father     Depression Father     Hearing loss Father     Other Father         elevated PSA but negative serial biopsies    Allergic rhinitis Sister     Allergies Sister     Angioedema Sister     Asthma Neg Hx     Eczema Neg Hx     Immunodeficiency Neg Hx        Social History     Socioeconomic History    Marital status: Single     Spouse name: Not on file    Number of children: Not on file    Years of education: Not on file    Highest education level: Not on file   Occupational History    Occupation: horticuturalist     Employer: [employer]   Social Needs    Financial resource strain: Not on file    Food insecurity     Worry: Not on file     Inability: Not on file    Transportation needs     Medical: Not on file     Non-medical: Not on file   Tobacco Use    Smoking status: Never Smoker    Smokeless tobacco: Never Used   Substance and Sexual Activity    Alcohol use: Yes     Frequency: Monthly or less     Drinks per session: 1 or 2    Drug use: Not  "Currently     Types: Benzodiazepines, Hydrocodone    Sexual activity: Never   Lifestyle    Physical activity     Days per week: Not on file     Minutes per session: Not on file    Stress: Very much   Relationships    Social connections     Talks on phone: Not on file     Gets together: Not on file     Attends Taoist service: Not on file     Active member of club or organization: Not on file     Attends meetings of clubs or organizations: Not on file     Relationship status: Not on file   Other Topics Concern    Not on file   Social History Narrative    Live alone       Current Outpatient Medications   Medication Sig Dispense Refill    atorvastatin (LIPITOR) 20 MG tablet TAKE 1 TABLET(20 MG) BY MOUTH EVERY EVENING 90 tablet 1    augmented betamethasone dipropionate (DIPROLENE-AF) 0.05 % cream Apply topically 2 (two) times daily. 15 g 1    hydrocortisone (ANUSOL-HC) 2.5 % rectal cream Place rectally 2 (two) times daily. 28 g 1    lisinopriL-hydrochlorothiazide (PRINZIDE,ZESTORETIC) 20-12.5 mg per tablet Take 1 tablet by mouth once daily. 90 tablet 1    omeprazole (PRILOSEC OTC) 20 MG tablet Take 20 mg by mouth once daily.      traZODone (DESYREL) 50 MG tablet Take 1 tablet (50 mg total) by mouth every evening. 90 tablet 1     No current facility-administered medications for this visit.        Review of patient's allergies indicates:   Allergen Reactions    Levofloxacin Shortness Of Breath and Hives    Quinolones Hives and Shortness Of Breath     Objective:    HPI     Numbness      Additional comments: back and right shoulder numbness          Last edited by Raul Tolliver MA on 9/21/2020  2:58 PM. (History)      Blood pressure 120/74, pulse 78, temperature 97.9 °F (36.6 °C), temperature source Temporal, height 5' 7.5" (1.715 m), weight 113.4 kg (250 lb), SpO2 97 %. Body mass index is 38.58 kg/m².   Physical Exam  Vitals signs and nursing note reviewed.   Constitutional:       General: He is not in " acute distress.     Appearance: He is well-developed. He is obese. He is not ill-appearing, toxic-appearing or diaphoretic.   HENT:      Head: Normocephalic and atraumatic.   Eyes:      General: No scleral icterus.        Right eye: No discharge.         Left eye: No discharge.      Conjunctiva/sclera: Conjunctivae normal.   Neck:      Musculoskeletal: Normal range of motion. Pain with movement and muscular tenderness present. No neck rigidity or spinous process tenderness.      Vascular: No carotid bruit.   Cardiovascular:      Rate and Rhythm: Normal rate and regular rhythm.      Heart sounds: Normal heart sounds. No murmur.   Pulmonary:      Effort: Pulmonary effort is normal. No respiratory distress.      Breath sounds: Normal breath sounds. No decreased breath sounds, wheezing, rhonchi or rales.   Chest:      Chest wall: No deformity, tenderness or crepitus.   Abdominal:      General: There is no distension.      Palpations: Abdomen is soft.      Tenderness: There is no abdominal tenderness. There is no guarding or rebound.   Musculoskeletal:      Right lower leg: No edema.      Left lower leg: No edema.   Skin:     General: Skin is warm and dry.   Neurological:      Mental Status: He is alert.      Motor: Motor function is intact. No weakness or tremor.   Psychiatric:         Mood and Affect: Mood normal.         Speech: Speech normal.         Behavior: Behavior normal.             Assessment:       1. Brachial plexus injury, right, initial encounter    2. Contusion of right chest wall, initial encounter        Plan:       Jai was seen today for numbness and fall.    Diagnoses and all orders for this visit:    Brachial plexus injury, right, initial encounter  Comments:  Strength/motor function appear intact.  Likely has some edema causing the sensory issues.   Trial steroids, NSAIDs.     Contusion of right chest wall, initial encounter

## 2020-09-22 ENCOUNTER — TELEPHONE (OUTPATIENT)
Dept: FAMILY MEDICINE | Facility: CLINIC | Age: 45
End: 2020-09-22

## 2020-09-22 NOTE — LETTER
September 22, 2020      [facility] [address] [facility]  [address] [place] [address] [address]  [facility] [address]  Phone: [phone]  Fax: [phone]       Patient: Jai Watkins   YOB: 1975  Date of Visit: 09/22/2020    To Whom It May Concern:    Sariah Watkins  was seen in my office on 09/21/20. He is excused from work for the following days 09/23/20 - 09/25/20. He may not return to work until 9/28/20. If you have any questions or concerns, or if I can be of further assistance, please do not hesitate to contact me.    Sincerely,    Dr. Ronny Prince

## 2020-09-24 DIAGNOSIS — S14.3XXA BRACHIAL PLEXUS INJURY, RIGHT, INITIAL ENCOUNTER: Primary | ICD-10-CM

## 2020-09-25 ENCOUNTER — TELEPHONE (OUTPATIENT)
Dept: ORTHOPEDICS | Facility: CLINIC | Age: 45
End: 2020-09-25

## 2020-10-07 ENCOUNTER — OFFICE VISIT (OUTPATIENT)
Dept: UROLOGY | Facility: CLINIC | Age: 45
End: 2020-10-07
Payer: COMMERCIAL

## 2020-10-07 ENCOUNTER — HOSPITAL ENCOUNTER (OUTPATIENT)
Dept: RADIOLOGY | Facility: HOSPITAL | Age: 45
Discharge: HOME | End: 2020-10-07
Attending: NURSE PRACTITIONER
Payer: COMMERCIAL

## 2020-10-07 VITALS
HEIGHT: 68 IN | DIASTOLIC BLOOD PRESSURE: 75 MMHG | BODY MASS INDEX: 38.67 KG/M2 | WEIGHT: 255.19 LBS | SYSTOLIC BLOOD PRESSURE: 122 MMHG | RESPIRATION RATE: 18 BRPM | HEART RATE: 94 BPM

## 2020-10-07 DIAGNOSIS — N50.819 PAIN IN TESTICLE, UNSPECIFIED LATERALITY: ICD-10-CM

## 2020-10-07 DIAGNOSIS — R10.9 ABDOMINAL PAIN, UNSPECIFIED ABDOMINAL LOCATION: Primary | ICD-10-CM

## 2020-10-07 DIAGNOSIS — R10.9 ABDOMINAL PAIN, UNSPECIFIED ABDOMINAL LOCATION: ICD-10-CM

## 2020-10-07 PROCEDURE — 3078F DIAST BP <80 MM HG: CPT | Mod: CPTII,S$GLB,, | Performed by: NURSE PRACTITIONER

## 2020-10-07 PROCEDURE — 99999 PR PBB SHADOW E&M-EST. PATIENT-LVL IV: ICD-10-PCS | Mod: PBBFAC,,, | Performed by: NURSE PRACTITIONER

## 2020-10-07 PROCEDURE — 51798 PR MEAS,POST-VOID RES,US,NON-IMAGING: ICD-10-PCS | Mod: S$GLB,,, | Performed by: NURSE PRACTITIONER

## 2020-10-07 PROCEDURE — 3008F BODY MASS INDEX DOCD: CPT | Mod: CPTII,S$GLB,, | Performed by: NURSE PRACTITIONER

## 2020-10-07 PROCEDURE — 99204 PR OFFICE/OUTPT VISIT, NEW, LEVL IV, 45-59 MIN: ICD-10-PCS | Mod: S$GLB,,, | Performed by: NURSE PRACTITIONER

## 2020-10-07 PROCEDURE — 99999 PR PBB SHADOW E&M-EST. PATIENT-LVL IV: CPT | Mod: PBBFAC,,, | Performed by: NURSE PRACTITIONER

## 2020-10-07 PROCEDURE — 3074F SYST BP LT 130 MM HG: CPT | Mod: CPTII,S$GLB,, | Performed by: NURSE PRACTITIONER

## 2020-10-07 PROCEDURE — 3078F PR MOST RECENT DIASTOLIC BLOOD PRESSURE < 80 MM HG: ICD-10-PCS | Mod: CPTII,S$GLB,, | Performed by: NURSE PRACTITIONER

## 2020-10-07 PROCEDURE — 3074F PR MOST RECENT SYSTOLIC BLOOD PRESSURE < 130 MM HG: ICD-10-PCS | Mod: CPTII,S$GLB,, | Performed by: NURSE PRACTITIONER

## 2020-10-07 PROCEDURE — 74176 CT ABD & PELVIS W/O CONTRAST: CPT | Mod: TC

## 2020-10-07 PROCEDURE — 74176 CT RENAL STONE STUDY ABD PELVIS WO: ICD-10-PCS | Mod: 26,,, | Performed by: RADIOLOGY

## 2020-10-07 PROCEDURE — 3008F PR BODY MASS INDEX (BMI) DOCUMENTED: ICD-10-PCS | Mod: CPTII,S$GLB,, | Performed by: NURSE PRACTITIONER

## 2020-10-07 PROCEDURE — 99204 OFFICE O/P NEW MOD 45 MIN: CPT | Mod: S$GLB,,, | Performed by: NURSE PRACTITIONER

## 2020-10-07 PROCEDURE — 74176 CT ABD & PELVIS W/O CONTRAST: CPT | Mod: 26,,, | Performed by: RADIOLOGY

## 2020-10-07 PROCEDURE — 51798 US URINE CAPACITY MEASURE: CPT | Mod: S$GLB,,, | Performed by: NURSE PRACTITIONER

## 2020-10-07 RX ORDER — DOXYCYCLINE HYCLATE 100 MG
100 TABLET ORAL 2 TIMES DAILY
Qty: 42 TABLET | Refills: 0 | Status: SHIPPED | OUTPATIENT
Start: 2020-10-07 | End: 2020-10-28

## 2020-10-07 NOTE — PATIENT INSTRUCTIONS
Abdominal Pain    Abdominal pain is pain in the stomach or belly area. Everyone has this pain from time to time. In many cases it goes away on its own. But abdominal pain can sometimes be due to a serious problem, such as appendicitis. So its important to know when to seek help.  Causes of abdominal pain  There are many possible causes of abdominal pain. Common causes in adults include:  · Constipation, diarrhea, or gas  · Stomach acid flowing back up into the esophagus (acid reflux or heartburn)  · Severe acid reflux, called GERD (gastroesophageal reflux disease)  · A sore in the lining of the stomach or small intestine (peptic ulcer)  · Inflammation of the gallbladder, liver, or pancreas  · Gallstones or kidney stones  · Appendicitis   · Intestinal blockage   · An internal organ pushing through a muscle or other tissue (hernia)  · Urinary tract infections  · In women, menstrual cramps, fibroids, or endometriosis  · Inflammation or infection of the intestines  Diagnosing the cause of abdominal pain  Your healthcare provider will do a physical exam help find the cause of your pain. If needed, tests will be ordered. Belly pain has many possible causes. So it can be hard to find the reason for your pain. Giving details about your pain can help. Tell your provider where and when you feel the pain, and what makes it better or worse. Also let your provider know if you have other symptoms such as:  · Fever  · Tiredness  · Upset stomach (nausea)  · Vomiting  · Changes in bathroom habits  Treating abdominal pain  Some causes of pain need emergency medical treatment right away. These include appendicitis or a bowel blockage. Other problems can be treated with rest, fluids, or medicines. Your healthcare provider can give you specific instructions for treatment or self-care based on what is causing your pain.  If you have vomiting or diarrhea, sip water or other clear fluids. When you are ready to eat solid foods again,  start with small amounts of easy-to-digest, low-fat foods. These include apple sauce, toast, or crackers.   When to seek medical care  Call 911 or go to the hospital right away if you:  · Cant pass stool and are vomiting  · Are vomiting blood or have bloody diarrhea or black, tarry diarrhea  · Have chest, neck, or shoulder pain  · Feel like you might pass out  · Have pain in your shoulder blades with nausea  · Have sudden, severe belly pain  · Have new, severe pain unlike any you have felt before  · Have a belly that is rigid, hard, and tender to touch  Call your healthcare provider if you have:  · Pain for more than 5 days  · Bloating for more than 2 days  · Diarrhea for more than 5 days  · A fever of 100.4°F (38.0°C) or higher, or as directed by your provider  · Pain that gets worse  · Weight loss for no reason  · Continued lack of appetite  · Blood in your stool  How to prevent abdominal pain  Here are some tips to help prevent abdominal pain:  · Eat smaller amounts of food at one time.  · Avoid greasy, fried, or other high-fat foods.  · Avoid foods that give you gas.  · Exercise regularly.  · Drink plenty of fluids.  To help prevent GERD symptoms:  · Quit smoking.  · Reduce alcohol and certain foods that increase stomach acid.  · Avoid aspirin and over-the-counter pain and fever medicines (NSAIDS or nonsteroidal anti-inflammatory drugs), if possible  · Lose extra weight.  · Finish eating at least 2 hours before you go to bed or lie down.  · Raise the head of your bed.  Date Last Reviewed: 7/1/2016  © 8228-1375 [employer]. [address] [place] [address], [place] [address]. All rights reserved. This information is not intended as a substitute for professional medical care. Always follow your healthcare professional's instructions.        Testicular Pain, Unclear Cause  You have had pain in one or both testicles. Based on your exam today, the exact cause of your pain is not certain. But your condition does  not appear to be dangerous. Testicles are very sensitive. Even a small injury can cause quite a bit of pain. Other possible causes of testicular pain include kidney stones, cysts, mumps, inflammatory conditions, chronic conditions, hernia, infection, and a twisted testicle.  Certain tests may be done to rule out an underlying problem causing the pain. Nothing conclusive was found today. Most likely, the pain will go away on its own. If it doesnt, you may need more tests.    Home care  Medicine may be prescribed to help relieve pain and swelling. This may be an over-the-counter pain reliever or prescription pain medication. Take all medicine as directed.  The following are general care guidelines:  · To relieve pain and swelling, apply an ice pack wrapped in a thin towel for 10 minutes at a time. Continue this on and off for 1 to 2 days.  · When lying down, place a small rolled towel under your scrotum. When moving around, wear a jockstrap (athletic supporter) or supportive underwear. These will help support and protect your testicles.  · If it hurts to walk, walk as little as possible until you feel better.  · Avoid strenuous activity until you feel better.  · Do not have sex until you feel better.  · If you have severe pain in the testicle, seek care right away. Delay may lead to permanent loss of the testicles function.  Follow-up care  Follow up with your healthcare provider, or as advised.  When to seek medical advice  Call your healthcare provider right away if any of these occur:  · Fever of 100.4°F (38°C) or higher  · Worsening of the pain or severe pain  · Swelling of the testicle or scrotum  · A lump in the scrotum  · Warm and red scrotum (signs of infection)  · Nausea and vomiting  · Pain or swelling in abdomen  · Trouble urinating  · Numbness or weakness in the leg  · Shrinking of the testicle  · Blood in your urine  Date Last Reviewed: 10/1/2016  © 9513-0695 [employer]. [address] [place] [address]  Road, [place] [address]. All rights reserved. This information is not intended as a substitute for professional medical care. Always follow your healthcare professional's instructions.

## 2020-10-07 NOTE — PROGRESS NOTES
"[facility] Note  Staff: BRIANA NicholsonC    PCP: Dr. Ronny Prince    Chief Complaint: Abdominal pain, testicle pain    Subjective:        HPI: Jai Watkins III is a 45 y.o. male NEW PT presents today with c/o lower abdominal pains, urinary urgency, and testicle pains.    Pt states today unknown if related to current complaints, but he reports a fall/injury around weekend of 9/20/2020.  Pt slipped and fell off his steps and got wedged into a tree hanging from right axilla (house is raised); all his weight was supported by his axilla.  A neighbor had to come help him get out of the tree. Pt saw his PCP on 9/21/2020 for having some neck, upper back between shoulder blades, and right chest wall pain.  At the time, pt c/o pain/hurting when he takes a deep breath.      Questions asked the pt during ov today:  Urgency: Yes, urge incontinence? None  +Urinary urgency/hesitancy-he has seen Dr. Aguayo in the past for these LUTS.  Dysuria: No  Gross Hematuria:No  Straining:Yes, Hesistancy:Yes - "sometimes", STDs?  None  Vasectomy?:  None    PVR by bladder scan performed by MA today:  32 mL*    Recent IMAGING at [facility]:  US of Scrotum and Testicles on 10/06/2020:  IMPRESSION:  1.  Normal appearing testicles  2.  No epididymal abnormality is detected sonographically but there is a rather small hydrocele detected on the left which may be related to inflammatory process.    REVIEW OF SYSTEMS:  A comprehensive 10 system review was performed and is negative except as noted above in HPI    PMHx:  Past Medical History:   Diagnosis Date    Anxiety     Depression     Eczema 10/30/2013    Hyperlipidemia LDL goal < 130     Hypertension     takes Metoprolol, also helps with palpitations    Recurrent upper respiratory infection (URI)     Sleep apnea     resolved with UPPP     PSHx:  Past Surgical History:   Procedure Laterality Date    ADENOIDECTOMY      ANAL FISTULOTOMY      CARPAL TUNNEL RELEASE " Bilateral     ROTATOR CUFF REPAIR  2011    right    SINUS SURGERY  2011    TONSILLECTOMY      UVULOPALATOPHARYNGOPLASTY  2011     Fam Hx:   malignancies: No       Allergies:  Levofloxacin and Quinolones    Medications: reviewed   Anticoagulation: No    Objective:     Vitals:    10/07/20 1433   BP: 122/75   Pulse: 94   Resp: 18     General:WDWN in NAD  Eyes: PERRLA, normal conjunctiva  Respiratory: no increased work on breathing, clear to auscultation  Cardiovascular: regular rate and rhythm. No obvious extremity edema.  GI: palpation of masses. No tenderness. No hepatosplenomegaly to palpation.  Musculoskeletal: normal range of motion of bilateral upper extremities. Normal muscle strength and tone.  Skin: no obvious rashes or lesions. No tightening of skin noted.  Neurologic: CN grossly normal. Normal sensation.   Psychiatric: awake, alert and oriented x 3. Mood and affect normal. Cooperative.    :  Pt deferred    LABS REVIEW:  UA today:  Color:Clear, Yellow  Spec. Grav.  1.030   PH  6.0  Negative for leukocytes, nitrates, protein, glucose, ketones, urobili, bili, and blood.    Assessment:       1. Abdominal pain, unspecified abdominal location    2. Pain in testicle, unspecified laterality          Plan:   Abdominal, perineal, testicle pain of unknown origin vs. Muscle strain of some unknown origin??    1.  Vibra-tabs 100 mg BID x 21 days prescribed to pt today for infection vs. Inflammatory process.  Med was prescribed to pt today as trial to see if med improves pt's current LUTS.  Benefits, risks and side affects were thoroughly explained to pt today in office with all questions answered.    2.   PSA total and free to be done.  3.   CT RSS to be performed for ongoing pt's abdominal/pelvic pain at this time.    The following instructions were given to the patient to assist with discomfort:  -Use jockstrap or the best supportive underwear he can get for relief of pressure.  -Alternate ice packs/heating pad  to areas.  -Take OTC anti-inflammatories  -Sit in a warm tub of water greater than 15 minutes  -Elevate Scrotal Sac    F/u-Our office will contact this pt after we receive and review ALL imaging and blood work results to determine best POC for this patient.  Pt verbalized understanding at conclusion of appointment today.  Pt was instructed to contact our office should their symptoms worsen or any new  complaints.    [facility]: Active    Jolie Reyes, DEVIN-C

## 2020-10-08 ENCOUNTER — HOSPITAL ENCOUNTER (OUTPATIENT)
Dept: RADIOLOGY | Facility: HOSPITAL | Age: 45
Discharge: HOME | End: 2020-10-08
Attending: PHYSICAL MEDICINE & REHABILITATION
Payer: COMMERCIAL

## 2020-10-08 ENCOUNTER — OFFICE VISIT (OUTPATIENT)
Dept: PHYSICAL MEDICINE AND REHAB | Facility: CLINIC | Age: 45
End: 2020-10-08
Payer: COMMERCIAL

## 2020-10-08 VITALS
HEART RATE: 74 BPM | BODY MASS INDEX: 37.89 KG/M2 | DIASTOLIC BLOOD PRESSURE: 85 MMHG | HEIGHT: 68 IN | SYSTOLIC BLOOD PRESSURE: 129 MMHG | WEIGHT: 250 LBS

## 2020-10-08 DIAGNOSIS — S14.3XXA BRACHIAL PLEXUS INJURY, RIGHT, INITIAL ENCOUNTER: Primary | ICD-10-CM
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
## 2020-10-08 DIAGNOSIS — S14.3XXA BRACHIAL PLEXUS INJURY, RIGHT, INITIAL ENCOUNTER: ICD-10-CM

## 2020-10-08 DIAGNOSIS — G56.31 RADIAL NERVE ENTRAPMENT, RIGHT: ICD-10-CM

## 2020-10-08 DIAGNOSIS — M54.2 CERVICALGIA: ICD-10-CM

## 2020-10-08 DIAGNOSIS — R20.2 PARESTHESIA: ICD-10-CM

## 2020-10-08 DIAGNOSIS — M25.511 ACUTE PAIN OF RIGHT SHOULDER: ICD-10-CM

## 2020-10-08 PROCEDURE — 99999 PR PBB SHADOW E&M-EST. PATIENT-LVL III: ICD-10-PCS | Mod: PBBFAC,,, | Performed by: PHYSICAL MEDICINE & REHABILITATION

## 2020-10-08 PROCEDURE — 76881 US COMPL JOINT R-T W/IMG: CPT | Mod: RT,S$GLB,, | Performed by: PHYSICAL MEDICINE & REHABILITATION

## 2020-10-08 PROCEDURE — 73030 XR SHOULDER COMPLETE 2 OR MORE VIEWS RIGHT: ICD-10-PCS | Mod: 26,RT,, | Performed by: RADIOLOGY

## 2020-10-08 PROCEDURE — 99999 PR PBB SHADOW E&M-EST. PATIENT-LVL III: CPT | Mod: PBBFAC,,, | Performed by: PHYSICAL MEDICINE & REHABILITATION

## 2020-10-08 PROCEDURE — 99204 PR OFFICE/OUTPT VISIT, NEW, LEVL IV, 45-59 MIN: ICD-10-PCS | Mod: S$GLB,,, | Performed by: PHYSICAL MEDICINE & REHABILITATION

## 2020-10-08 PROCEDURE — 73030 X-RAY EXAM OF SHOULDER: CPT | Mod: TC,FY,RT

## 2020-10-08 PROCEDURE — 99204 OFFICE O/P NEW MOD 45 MIN: CPT | Mod: S$GLB,,, | Performed by: PHYSICAL MEDICINE & REHABILITATION

## 2020-10-08 PROCEDURE — 73030 X-RAY EXAM OF SHOULDER: CPT | Mod: 26,RT,, | Performed by: RADIOLOGY

## 2020-10-08 PROCEDURE — 76881 PR US EXTREMITY OR AXILLA, TISSUE/MUSCLE/JOINT/NERVE; COMPLETE: ICD-10-PCS | Mod: RT,S$GLB,, | Performed by: PHYSICAL MEDICINE & REHABILITATION

## 2020-10-08 PROCEDURE — 3079F PR MOST RECENT DIASTOLIC BLOOD PRESSURE 80-89 MM HG: ICD-10-PCS | Mod: CPTII,S$GLB,, | Performed by: PHYSICAL MEDICINE & REHABILITATION

## 2020-10-08 PROCEDURE — 3008F BODY MASS INDEX DOCD: CPT | Mod: CPTII,S$GLB,, | Performed by: PHYSICAL MEDICINE & REHABILITATION

## 2020-10-08 PROCEDURE — 3074F PR MOST RECENT SYSTOLIC BLOOD PRESSURE < 130 MM HG: ICD-10-PCS | Mod: CPTII,S$GLB,, | Performed by: PHYSICAL MEDICINE & REHABILITATION

## 2020-10-08 PROCEDURE — 3074F SYST BP LT 130 MM HG: CPT | Mod: CPTII,S$GLB,, | Performed by: PHYSICAL MEDICINE & REHABILITATION

## 2020-10-08 PROCEDURE — 3079F DIAST BP 80-89 MM HG: CPT | Mod: CPTII,S$GLB,, | Performed by: PHYSICAL MEDICINE & REHABILITATION

## 2020-10-08 PROCEDURE — 3008F PR BODY MASS INDEX (BMI) DOCUMENTED: ICD-10-PCS | Mod: CPTII,S$GLB,, | Performed by: PHYSICAL MEDICINE & REHABILITATION

## 2020-10-08 NOTE — LETTER
October 8, 2020      Ronny Prince Jr., MD  [address]  [place] [address]           [facility]  [address] [place] [address] [address]  [place] [address]  Phone: [phone]  Fax: [phone]          Patient: Jai Watkins III   MR Number: [id number]   YOB: 1975   Date of Visit: 10/8/2020       Dear Dr. Ronny Prince Jr.:    Thank you for referring Jai Watkins to me for evaluation. Attached you will find relevant portions of my assessment and plan of care.    If you have questions, please do not hesitate to call me. I look forward to following Jai Watkins along with you.    Sincerely,    Michele Coats MD    Enclosure  CC:  No Recipients    If you would like to receive this communication electronically, please contact [email or web address] or [phone] to request more information on [employer] access.    For providers and/or their staff who would like to refer a patient to [facility], please contact us through our one-stop-shop provider referral line, [facility], at [phone].    If you feel you have received this communication in error or would no longer like to receive these types of communications, please e-mail [email or web address]

## 2020-10-08 NOTE — PROCEDURES
Procedures     Complete diagnostic ultrasound examination of the right shoulder    Indications:  Right shoulder pain    Findings:  Using a linear transducer, the right shoulder was evaluated.  Anteriorly, long head of the biceps tendon appeared grossly normal.  The subscapularis tendon appeared normal at the insertion on the lesser tuberosity.  The right shoulder was then placed in the modified crass position.  The right supraspinatus tendon appeared grossly normal.  Posteriorly, the infraspinatus and teres minor tendons appear normal.  Using a curvilinear transducer, the posterior glenoid labrum was evaluated.  It was poorly visualized, however appeared grossly intact.    Impression:  1.  Essentially normal examination extra-articular soft tissue structures of the right shoulder

## 2020-10-08 NOTE — PROGRESS NOTES
Today's Date: 10/08/2020     Referring Provider: Dr. Ronny CLAUDIO*     Chief Complaint:   Chief Complaint   Patient presents with    Arm Pain     right arm pain       HPI: Jai Watkins III is a 45 y.o. male  who presents today for evaluation of right shoulder pain and paresthesias of the right hand.  He states that his symptoms started approximately 3 weeks ago.  He was walking out of his house and slipped on his front porch.  He tried to catch himself on the railing and fell through the railing.  He fell approximately 6 ft and landed in a [place].  He landed with his right axilla lodged between 2 branches.  He was dangling about 1 ft off the ground.  He was there for approximately 10 min before he receives help to get down.  He states that he had some bruising in the axilla and proximal biceps.  He initially had some numbness and tingling in the right hand in all fingers as well as weakness in the right arm.  Weakness has significantly improved.  He does have some intermittent numbness and tingling in the right hand and fingers.  He does complain of pain over the posterior shoulder worse with forward flexion and adduction.  He does complain of neck pain worse with lying down.  He denies any gait ataxia, or bowel or bladder changes, or muscle spasticity.  Pain on average is a 3/10    Review of Systems   Constitutional: Negative for chills and fever.   HENT: Negative for congestion and tinnitus.    Eyes: Negative for blurred vision and photophobia.   Respiratory: Negative for shortness of breath and wheezing.    Cardiovascular: Negative for chest pain and palpitations.   Gastrointestinal: Negative for nausea and vomiting.   Genitourinary: Negative for dysuria and frequency.   Musculoskeletal: Positive for joint pain, myalgias and neck pain.   Skin: Negative for itching and rash.   Neurological: Positive for tingling and sensory change. Negative for speech change, weakness and headaches.    Endo/Heme/Allergies: Negative for environmental allergies. Does not bruise/bleed easily.   Psychiatric/Behavioral: Negative for depression. The patient is not nervous/anxious.         Social History     Socioeconomic History    Marital status: Single     Spouse name: Not on file    Number of children: Not on file    Years of education: Not on file    Highest education level: Not on file   Occupational History    Occupation: horticuturalist     Employer: [employer]   Social Needs    Financial resource strain: Not on file    Food insecurity     Worry: Not on file     Inability: Not on file    Transportation needs     Medical: Not on file     Non-medical: Not on file   Tobacco Use    Smoking status: Never Smoker    Smokeless tobacco: Never Used   Substance and Sexual Activity    Alcohol use: Yes     Frequency: Monthly or less     Drinks per session: 1 or 2    Drug use: Not Currently     Types: Benzodiazepines, Hydrocodone    Sexual activity: Never   Lifestyle    Physical activity     Days per week: Not on file     Minutes per session: Not on file    Stress: Very much   Relationships    Social connections     Talks on phone: Not on file     Gets together: Not on file     Attends Yazidi service: Not on file     Active member of club or organization: Not on file     Attends meetings of clubs or organizations: Not on file     Relationship status: Not on file   Other Topics Concern    Not on file   Social History Narrative    Live alone       Family History   Problem Relation Age of Onset    Hypertension Mother     Heart disease Mother 55        CAD, CABG    Diabetes Mother     Heart attack Mother     Arthritis Mother     Depression Mother     Hypertension Father     Arthritis Father     Kidney disease Father     Depression Father     Hearing loss Father     Other Father         elevated PSA but negative serial biopsies    Allergic rhinitis Sister     Allergies Sister      Angioedema Sister     Asthma Neg Hx     Eczema Neg Hx     Immunodeficiency Neg Hx        Current Outpatient Medications on File Prior to Visit   Medication Sig Dispense Refill    atorvastatin (LIPITOR) 20 MG tablet TAKE 1 TABLET(20 MG) BY MOUTH EVERY EVENING 90 tablet 1    augmented betamethasone dipropionate (DIPROLENE-AF) 0.05 % cream Apply topically 2 (two) times daily. 15 g 1    doxycycline (VIBRA-TABS) 100 MG tablet Take 1 tablet (100 mg total) by mouth 2 (two) times daily. for 21 days 42 tablet 0    hydrocortisone (ANUSOL-HC) 2.5 % rectal cream Place rectally 2 (two) times daily. 28 g 1    lisinopriL-hydrochlorothiazide (PRINZIDE,ZESTORETIC) 20-12.5 mg per tablet Take 1 tablet by mouth once daily. 90 tablet 1    MELOXICAM ORAL Take by mouth.      naproxen (NAPROSYN) 500 MG tablet Take 1 tablet (500 mg total) by mouth 2 (two) times daily. 60 tablet 1    omeprazole (PRILOSEC OTC) 20 MG tablet Take 20 mg by mouth once daily.      traZODone (DESYREL) 50 MG tablet Take 1 tablet (50 mg total) by mouth every evening. 90 tablet 1     No current facility-administered medications on file prior to visit.         Review of patient's allergies indicates:   Allergen Reactions    Levofloxacin Shortness Of Breath and Hives    Quinolones Hives and Shortness Of Breath       Past Surgical History:   Procedure Laterality Date    ADENOIDECTOMY      ANAL FISTULOTOMY      CARPAL TUNNEL RELEASE Bilateral     ROTATOR CUFF REPAIR  2011    right    SINUS SURGERY  2011    TONSILLECTOMY      UVULOPALATOPHARYNGOPLASTY  2011       Past Medical History:   Diagnosis Date    Anxiety     Depression     Eczema 10/30/2013    Hyperlipidemia LDL goal < 130     Hypertension     takes Metoprolol, also helps with palpitations    Recurrent upper respiratory infection (URI)     Sleep apnea     resolved with UPPP       Vitals:    10/08/20 1516   BP: 129/85   Pulse: 74       Physical Exam  Constitutional:       Appearance:  Normal appearance.   HENT:      Head: Normocephalic and atraumatic.      Nose: Nose normal. No congestion.      Mouth/Throat:      Mouth: Mucous membranes are moist.      Pharynx: Oropharynx is clear.   Eyes:      Extraocular Movements: Extraocular movements intact.      Pupils: Pupils are equal, round, and reactive to light.   Neck:      Comments: Negative [place]  Pulmonary:      Effort: Pulmonary effort is normal. No respiratory distress.   Abdominal:      General: Abdomen is flat. There is no distension.   Skin:     General: Skin is warm and dry.      Nails: There is no clubbing.     Neurological:      General: No focal deficit present.      Mental Status: He is alert and oriented to person, place, and time.      Cranial Nerves: Cranial nerves are intact.      Sensory: Sensory deficit: Altered in the right C6/radial nerve distribute.      Motor: Motor function is intact.      Gait: Gait is intact.      Deep Tendon Reflexes:      Reflex Scores:       Tricep reflexes are 2+ on the right side and 2+ on the left side.       Bicep reflexes are 2+ on the right side and 2+ on the left side.       Brachioradialis reflexes are 2+ on the right side and 2+ on the left side.       Patellar reflexes are 2+ on the right side and 2+ on the left side.     Comments: Negative clonus.  Negative [place]andez   Psychiatric:         Mood and Affect: Mood and affect normal.        Right Shoulder Exam     Tenderness   Right shoulder tenderness location: Posterior glenohumeral joint.    Range of Motion   Active abduction: normal   Passive abduction: normal   Extension: normal   External rotation: normal   Forward flexion: abnormal     Muscle Strength   Abduction: 5/5   Internal rotation: 5/5   External rotation: 5/5   Biceps: 5/5     Tests   Apprehension: positive  Fabian test: negative  Cross arm: negative  Impingement: negative  Drop arm: negative    Other   Scars: absent    Comments:  Positive [place]'s             Brachial plexus  injury, right, initial encounter  -     Ambulatory referral/consult to Physical Medicine Rehab  -     X-ray Shoulder 2 or More Views Right; Future; Expected date: 10/08/2020  -     EMG W/ ULTRASOUND AND NERVE CONDUCTION TEST 2 Extremities; Future    Acute pain of right shoulder    Cervicalgia    Radial nerve entrapment, right    Fall (on) (from) other stairs and steps, initial encounter    Paresthesia           PLAN:   Jai Watkins III is a very pleasant 45 y.o. male with right shoulder pain and paresthesias of the right hand a C6/radial nerve distribution.  History and physical examination may be consistent with a radial neuropathy versus brachial plexopathy versus a C6 radiculopathy.  He does have a CT scan of the neck which shows a C6-7 disc space narrowing and osteophyte formation.  He has had injections in the neck in the past which provided him significant improvement in neck pain.  Mechanism of injury may be consistent with a radial nerve compression at the proximal humerus.  I performed a diagnostic ultrasound examination of the right shoulder which showed essentially normal rotator cuff.  He does have a positive apprehension which may indicate a labral pathology.  I will order a x-ray of the right shoulder to assess for any gross abnormalities.  I will order a EMG/NCS of the bilateral upper extremities to assess for a radial neuropathy versus other peripheral nerve compression versus cervical radiculopathy.      Michele Coats D.O.  Physical Medicine and Rehabilitation          CC: Patients PCP: Ronny Prince Jr, MD  CC: Referring Provider: Dr. Ronny CLAUDIO*

## 2020-10-09 ENCOUNTER — TELEPHONE (OUTPATIENT)
Dept: PHYSICAL MEDICINE AND REHAB | Facility: CLINIC | Age: 45
End: 2020-10-09

## 2020-10-09 ENCOUNTER — PATIENT MESSAGE (OUTPATIENT)
Dept: PHYSICAL MEDICINE AND REHAB | Facility: CLINIC | Age: 45
End: 2020-10-09

## 2020-10-12 ENCOUNTER — OFFICE VISIT (OUTPATIENT)
Dept: PHYSICAL MEDICINE AND REHAB | Facility: CLINIC | Age: 45
End: 2020-10-12
Payer: COMMERCIAL

## 2020-10-12 DIAGNOSIS — R20.2 PARESTHESIA: Primary | ICD-10-CM

## 2020-10-12 DIAGNOSIS — S14.3XXA BRACHIAL PLEXUS INJURY, RIGHT, INITIAL ENCOUNTER: ICD-10-CM

## 2020-10-12 PROCEDURE — 99499 UNLISTED E&M SERVICE: CPT | Mod: S$GLB,,, | Performed by: PHYSICAL MEDICINE & REHABILITATION

## 2020-10-12 PROCEDURE — 99499 NO LOS: ICD-10-PCS | Mod: S$GLB,,, | Performed by: PHYSICAL MEDICINE & REHABILITATION

## 2020-10-12 PROCEDURE — 95910 NRV CNDJ TEST 7-8 STUDIES: CPT | Mod: S$GLB,,, | Performed by: PHYSICAL MEDICINE & REHABILITATION

## 2020-10-12 PROCEDURE — 95886 MUSC TEST DONE W/N TEST COMP: CPT | Mod: S$GLB,,, | Performed by: PHYSICAL MEDICINE & REHABILITATION

## 2020-10-12 PROCEDURE — 95886 PR EMG COMPLETE, W/ NERVE CONDUCTION STUDIES, 5+ MUSCLES: ICD-10-PCS | Mod: S$GLB,,, | Performed by: PHYSICAL MEDICINE & REHABILITATION

## 2020-10-12 PROCEDURE — 95910 PR NERVE CONDUCTION STUDY; 7-8 STUDIES: ICD-10-PCS | Mod: S$GLB,,, | Performed by: PHYSICAL MEDICINE & REHABILITATION

## 2020-10-12 NOTE — PROCEDURES
Procedures         [facility]  Physical Medicine and Rehabilitation   [address] [place] [address], [address]  [place] [address]             Full Name: RAND GARCIA Gender: Male  Patient ID: [id number] YOB: 1975      Visit Date: 10/12/2020 11:19  Age: 45 Years 2 Months Old  Examining Physician: GEORGIA DÍAZ DO      Sensory NCS      Nerve / Sites Rec. Site Onset Lat Peak Lat NP Amp Segments Distance Velocity     ms ms µV  cm m/s   R Median - Digit II (Antidromic)      Wrist Dig II 2.71 3.54 22.1 Wrist - Dig II 14 52   R Ulnar - Digit V (Antidromic)      Wrist Dig V 2.40 2.97 16.9 Wrist - Dig V 14 58   R Radial - Anatomical snuff box (Forearm)      Forearm Wrist 1.77 2.24 9.8 Forearm - Wrist 10 56   L Radial - Anatomical snuff box (Forearm)      Forearm Wrist 1.98 2.55 6.7 Forearm - Wrist 10 51       Motor NCS      Nerve / Sites Muscle Latency Amplitude Amp % Duration Segments Distance Lat Diff Velocity     ms mV % ms  cm ms m/s   R Median - APB      Wrist APB 3.70 6.5 100 7.14 Wrist - APB 8        Elbow APB 7.55 5.7 88 7.29 Elbow - Wrist 20 3.85 52   R Ulnar - ADM      Wrist ADM 2.50 7.1 100 4.32 Wrist - ADM 8        B.Elbow ADM 6.09 3.2 45.4 4.53 B.Elbow - Wrist 22 3.59 61      A.Elbow ADM 7.34 7.0 98.9 4.79 A.Elbow - B.Elbow 8 1.25 64       Motor NCS      Nerve / Sites Muscle Latency Amplitude Amp % Duration Segments Distance Lat Diff Velocity     ms mV % ms  cm ms m/s   R Radial - EIP      Forearm EIP 2.45 1.1 100 7.24 Forearm - EIP 8        Elbow EIP 5.52 2.9 258 6.30 Elbow - Forearm 15 3.07 49   L Radial - EIP      Forearm EIP 2.03 1.8 100 6.67 Forearm - EIP 8        Elbow EIP 5.16 2.4 132 5.68 Elbow - Forearm 14 3.13 45       EMG Summary Table     Spontaneous MUAP Recruitment   Muscle IA Fib PSW Fasc Other Amp Dur. PPP Pattern   R. Deltoid N None None None . N N N N   R. Biceps brachii N None None None . N N N N   R. Triceps brachii N None None None . N N N N   R. Pronator teres N None None  None . N N N N   R. Cervical paraspinals N None None None . N N N N   R. Extensor indicis proprius N None None None . N N N N   R. First dorsal interosseous N None None None . N N N N       Summary    The motor conduction test was performed on 4 nerve(s). The results were normal in 2 nerve(s): R Median - APB, R Ulnar - ADM. Findings were unremarkable in 2 nerve(s): R Radial - EIP, L Radial - EIP. There were no results outside the specified normal range.      The sensory conduction test was performed on 4 nerve(s). The results were normal in 2 nerve(s): R Median - Digit II (Antidromic), R Ulnar - Digit V (Antidromic). Results outside the specified normal range were found in 2 nerve(s), as follows:   In the R Radial - Anatomical snuff box (Forearm) study  o the peak amplitude result was reduced for Forearm stimulation   In the L Radial - Anatomical snuff box (Forearm) study  o the peak latency result was increased for Forearm stimulation  o the peak amplitude result was reduced for Forearm stimulation    The needle EMG study was normal in all 7 tested muscles: R. Deltoid, R. Biceps brachii, R. Triceps brachii, R. Pronator teres, R. Cervical paraspinals, R. Extensor indicis proprius, R. First dorsal interosseous.        Impression:   Normal Examination.     No evidence of cervical radiculopathy in the muscles tested of the right upper extremity and associated paraspinals.  There is no evidence of peripheral nerve injury of the right upper extremity.    Clinical history:  The chief complaint of numbness and tingling in the 1st 2nd and 3rd digits of the right hand after a fall may be consistent with a radial neurapraxia at the proximal humerus.  It is also possible, that it is rather early in the course of the injury and electrodiagnostic abnormalities may take weeks to months to evolve.  His symptoms have been improving and almost completely resolved.  His pain is now localized to the posterior shoulder and worse with  shoulder flexion and adduction.  He has a positive [place]'s on the right with reproduction in pain.  I suspect that he does have a labral pathology of the right shoulder.  His pain is only been present for approximately 6 weeks now.  He is about to switch jobs to a less physically demanding job.  Should his symptoms continue or worsen, I will likely order an MR arthrogram of the right shoulder.    ____________________________  Michele Coats D.O.  Board Certified by the American Board of Physical Medicine and Rehabilitation  Board Certified by the American Board of Electrodiagnostic Medicine

## 2021-03-05 ENCOUNTER — HOSPITAL ENCOUNTER (EMERGENCY)
Facility: HOSPITAL | Age: 46
Discharge: HOME | End: 2021-03-05
Attending: EMERGENCY MEDICINE
Payer: COMMERCIAL

## 2021-03-05 VITALS
WEIGHT: 260.13 LBS | SYSTOLIC BLOOD PRESSURE: 132 MMHG | DIASTOLIC BLOOD PRESSURE: 70 MMHG | OXYGEN SATURATION: 99 % | TEMPERATURE: 98 F | HEART RATE: 71 BPM | RESPIRATION RATE: 16 BRPM | HEIGHT: 67 IN | BODY MASS INDEX: 40.83 KG/M2

## 2021-03-05 DIAGNOSIS — M25.572 LEFT ANKLE PAIN: ICD-10-CM

## 2021-03-05 PROCEDURE — 99283 EMERGENCY DEPT VISIT LOW MDM: CPT | Mod: 25

## 2021-03-05 RX ORDER — DICLOFENAC SODIUM 10 MG/G
2 GEL TOPICAL 4 TIMES DAILY PRN
Qty: 100 G | Refills: 0 | Status: SHIPPED | OUTPATIENT
Start: 2021-03-05

## 2021-03-08 DIAGNOSIS — E78.5 DYSLIPIDEMIA: ICD-10-CM

## 2021-03-08 DIAGNOSIS — I10 ESSENTIAL HYPERTENSION: ICD-10-CM

## 2021-03-08 RX ORDER — ATORVASTATIN CALCIUM 20 MG/1
TABLET, FILM COATED ORAL
Qty: 90 TABLET | Refills: 0 | Status: SHIPPED | OUTPATIENT
Start: 2021-03-08 | End: 2021-03-24 | Stop reason: SDUPTHER

## 2021-03-08 RX ORDER — LISINOPRIL AND HYDROCHLOROTHIAZIDE 12.5; 2 MG/1; MG/1
1 TABLET ORAL DAILY
Qty: 90 TABLET | Refills: 0 | Status: SHIPPED | OUTPATIENT
Start: 2021-03-08 | End: 2021-03-24 | Stop reason: SDUPTHER

## 2021-03-10 ENCOUNTER — OFFICE VISIT (OUTPATIENT)
Dept: PHYSICAL MEDICINE AND REHAB | Facility: CLINIC | Age: 46
End: 2021-03-10
Payer: COMMERCIAL

## 2021-03-10 VITALS
BODY MASS INDEX: 40.81 KG/M2 | DIASTOLIC BLOOD PRESSURE: 84 MMHG | HEART RATE: 71 BPM | SYSTOLIC BLOOD PRESSURE: 138 MMHG | HEIGHT: 67 IN | WEIGHT: 260 LBS

## 2021-03-10 DIAGNOSIS — M54.16 LUMBAR RADICULOPATHY: Primary | ICD-10-CM

## 2021-03-10 DIAGNOSIS — G89.29 CHRONIC BILATERAL LOW BACK PAIN WITH LEFT-SIDED SCIATICA: ICD-10-CM

## 2021-03-10 DIAGNOSIS — M54.42 CHRONIC BILATERAL LOW BACK PAIN WITH LEFT-SIDED SCIATICA: ICD-10-CM

## 2021-03-10 DIAGNOSIS — E66.01 CLASS 3 SEVERE OBESITY WITH BODY MASS INDEX (BMI) OF 40.0 TO 44.9 IN ADULT, UNSPECIFIED OBESITY TYPE, UNSPECIFIED WHETHER SERIOUS COMORBIDITY PRESENT: ICD-10-CM

## 2021-03-10 DIAGNOSIS — Z74.09 IMPAIRED FUNCTIONAL MOBILITY AND ACTIVITY TOLERANCE: ICD-10-CM

## 2021-03-10 DIAGNOSIS — G89.29 CHRONIC PAIN OF LEFT ANKLE: ICD-10-CM

## 2021-03-10 DIAGNOSIS — G89.29 OTHER CHRONIC PAIN: ICD-10-CM

## 2021-03-10 DIAGNOSIS — M25.572 CHRONIC PAIN OF LEFT ANKLE: ICD-10-CM

## 2021-03-10 PROCEDURE — 99214 PR OFFICE/OUTPT VISIT, EST, LEVL IV, 30-39 MIN: ICD-10-PCS | Mod: S$GLB,,, | Performed by: PHYSICAL MEDICINE & REHABILITATION

## 2021-03-10 PROCEDURE — 99999 PR PBB SHADOW E&M-EST. PATIENT-LVL III: ICD-10-PCS | Mod: PBBFAC,,, | Performed by: PHYSICAL MEDICINE & REHABILITATION

## 2021-03-10 PROCEDURE — 76882 US LMTD JT/FCL EVL NVASC XTR: CPT | Mod: S$GLB,,, | Performed by: PHYSICAL MEDICINE & REHABILITATION

## 2021-03-10 PROCEDURE — 76882 PR  US,EXTREMITY,NONVASCULAR,REAL-TIME IMAGE,LIMITED: ICD-10-PCS | Mod: S$GLB,,, | Performed by: PHYSICAL MEDICINE & REHABILITATION

## 2021-03-10 PROCEDURE — 99999 PR PBB SHADOW E&M-EST. PATIENT-LVL III: CPT | Mod: PBBFAC,,, | Performed by: PHYSICAL MEDICINE & REHABILITATION

## 2021-03-10 PROCEDURE — 99214 OFFICE O/P EST MOD 30 MIN: CPT | Mod: S$GLB,,, | Performed by: PHYSICAL MEDICINE & REHABILITATION

## 2021-03-10 RX ORDER — GABAPENTIN 300 MG/1
300 CAPSULE ORAL NIGHTLY
Qty: 30 CAPSULE | Refills: 0 | Status: SHIPPED | OUTPATIENT
Start: 2021-03-10 | End: 2021-03-23

## 2021-03-15 DIAGNOSIS — G47.00 INSOMNIA, UNSPECIFIED TYPE: ICD-10-CM

## 2021-03-15 RX ORDER — TRAZODONE HYDROCHLORIDE 50 MG/1
TABLET ORAL
Qty: 90 TABLET | Refills: 0 | Status: SHIPPED | OUTPATIENT
Start: 2021-03-15 | End: 2021-05-10

## 2021-03-23 ENCOUNTER — OFFICE VISIT (OUTPATIENT)
Dept: PHYSICAL MEDICINE AND REHAB | Facility: CLINIC | Age: 46
End: 2021-03-23
Payer: COMMERCIAL

## 2021-03-23 DIAGNOSIS — M54.9 DORSALGIA, UNSPECIFIED: ICD-10-CM

## 2021-03-23 DIAGNOSIS — M54.16 LUMBAR RADICULOPATHY: ICD-10-CM

## 2021-03-23 PROCEDURE — 99499 NO LOS: ICD-10-PCS | Mod: S$GLB,,, | Performed by: PHYSICAL MEDICINE & REHABILITATION

## 2021-03-23 PROCEDURE — 95908 PR NERVE CONDUCTION STUDY; 3-4 STUDIES: ICD-10-PCS | Mod: S$GLB,,, | Performed by: PHYSICAL MEDICINE & REHABILITATION

## 2021-03-23 PROCEDURE — 99999 PR PBB SHADOW E&M-EST. PATIENT-LVL I: ICD-10-PCS | Mod: PBBFAC,,, | Performed by: PHYSICAL MEDICINE & REHABILITATION

## 2021-03-23 PROCEDURE — 95886 MUSC TEST DONE W/N TEST COMP: CPT | Mod: S$GLB,,, | Performed by: PHYSICAL MEDICINE & REHABILITATION

## 2021-03-23 PROCEDURE — 95886 PR EMG COMPLETE, W/ NERVE CONDUCTION STUDIES, 5+ MUSCLES: ICD-10-PCS | Mod: S$GLB,,, | Performed by: PHYSICAL MEDICINE & REHABILITATION

## 2021-03-23 PROCEDURE — 95908 NRV CNDJ TST 3-4 STUDIES: CPT | Mod: S$GLB,,, | Performed by: PHYSICAL MEDICINE & REHABILITATION

## 2021-03-23 PROCEDURE — 99499 UNLISTED E&M SERVICE: CPT | Mod: S$GLB,,, | Performed by: PHYSICAL MEDICINE & REHABILITATION

## 2021-03-23 PROCEDURE — 99999 PR PBB SHADOW E&M-EST. PATIENT-LVL I: CPT | Mod: PBBFAC,,, | Performed by: PHYSICAL MEDICINE & REHABILITATION

## 2021-03-23 RX ORDER — GABAPENTIN 300 MG/1
300 CAPSULE ORAL 2 TIMES DAILY
Qty: 60 CAPSULE | Refills: 0 | Status: SHIPPED | OUTPATIENT
Start: 2021-03-23 | End: 2021-05-18 | Stop reason: SDUPTHER

## 2021-03-24 ENCOUNTER — OFFICE VISIT (OUTPATIENT)
Dept: FAMILY MEDICINE | Facility: CLINIC | Age: 46
End: 2021-03-24
Payer: COMMERCIAL

## 2021-03-24 VITALS
WEIGHT: 262 LBS | HEART RATE: 88 BPM | SYSTOLIC BLOOD PRESSURE: 102 MMHG | TEMPERATURE: 98 F | BODY MASS INDEX: 41.12 KG/M2 | OXYGEN SATURATION: 95 % | DIASTOLIC BLOOD PRESSURE: 68 MMHG | HEIGHT: 67 IN

## 2021-03-24 DIAGNOSIS — R73.01 IMPAIRED FASTING GLUCOSE: ICD-10-CM

## 2021-03-24 DIAGNOSIS — E78.5 DYSLIPIDEMIA: ICD-10-CM

## 2021-03-24 DIAGNOSIS — M54.17 LUMBOSACRAL RADICULOPATHY AT L5: ICD-10-CM

## 2021-03-24 DIAGNOSIS — I10 ESSENTIAL HYPERTENSION: Primary | ICD-10-CM

## 2021-03-24 DIAGNOSIS — G47.33 OBSTRUCTIVE SLEEP APNEA SYNDROME: ICD-10-CM

## 2021-03-24 DIAGNOSIS — E66.01 CLASS 3 SEVERE OBESITY WITH BODY MASS INDEX (BMI) OF 40.0 TO 44.9 IN ADULT, UNSPECIFIED OBESITY TYPE, UNSPECIFIED WHETHER SERIOUS COMORBIDITY PRESENT: ICD-10-CM

## 2021-03-24 PROCEDURE — 99214 PR OFFICE/OUTPT VISIT, EST, LEVL IV, 30-39 MIN: ICD-10-PCS | Mod: S$GLB,,, | Performed by: INTERNAL MEDICINE

## 2021-03-24 PROCEDURE — 99214 OFFICE O/P EST MOD 30 MIN: CPT | Mod: S$GLB,,, | Performed by: INTERNAL MEDICINE

## 2021-03-24 RX ORDER — LISINOPRIL AND HYDROCHLOROTHIAZIDE 12.5; 2 MG/1; MG/1
1 TABLET ORAL DAILY
Qty: 90 TABLET | Refills: 0 | Status: SHIPPED | OUTPATIENT
Start: 2021-06-01 | End: 2021-07-02

## 2021-03-24 RX ORDER — ATORVASTATIN CALCIUM 20 MG/1
TABLET, FILM COATED ORAL
Qty: 90 TABLET | Refills: 0 | Status: SHIPPED | OUTPATIENT
Start: 2021-06-01 | End: 2021-07-02

## 2021-04-20 ENCOUNTER — HOSPITAL ENCOUNTER (OUTPATIENT)
Dept: RADIOLOGY | Facility: HOSPITAL | Age: 46
Discharge: HOME | End: 2021-04-20
Attending: PHYSICAL MEDICINE & REHABILITATION
Payer: COMMERCIAL

## 2021-04-20 DIAGNOSIS — M54.9 DORSALGIA, UNSPECIFIED: ICD-10-CM

## 2021-04-20 PROCEDURE — 72148 MRI LUMBAR SPINE WITHOUT CONTRAST: ICD-10-PCS | Mod: 26,,, | Performed by: RADIOLOGY

## 2021-04-20 PROCEDURE — 72148 MRI LUMBAR SPINE W/O DYE: CPT | Mod: TC

## 2021-04-20 PROCEDURE — 72148 MRI LUMBAR SPINE W/O DYE: CPT | Mod: 26,,, | Performed by: RADIOLOGY

## 2021-04-29 ENCOUNTER — PATIENT MESSAGE (OUTPATIENT)
Dept: RESEARCH | Facility: HOSPITAL | Age: 46
End: 2021-04-29

## 2021-05-09 LAB
ALBUMIN SERPL-MCNC: 4.3 G/DL (ref 4–5)
ALBUMIN/GLOB SERPL: 1.9 {RATIO} (ref 1.2–2.2)
ALP SERPL-CCNC: 90 IU/L (ref 39–117)
ALT SERPL-CCNC: 42 IU/L (ref 0–44)
APPEARANCE UR: CLEAR
AST SERPL-CCNC: 27 IU/L (ref 0–40)
BILIRUB SERPL-MCNC: 0.3 MG/DL (ref 0–1.2)
BILIRUB UR QL STRIP: NEGATIVE
BUN SERPL-MCNC: 15 MG/DL (ref 6–24)
BUN/CREAT SERPL: 17 (ref 9–20)
CALCIUM SERPL-MCNC: 9.7 MG/DL (ref 8.7–10.2)
CHLORIDE SERPL-SCNC: 102 MMOL/L (ref 96–106)
CHOLEST SERPL-MCNC: 143 MG/DL (ref 100–199)
CO2 SERPL-SCNC: 21 MMOL/L (ref 20–29)
COLOR UR: YELLOW
CREAT SERPL-MCNC: 0.89 MG/DL (ref 0.76–1.27)
GLOBULIN SER CALC-MCNC: 2.3 G/DL (ref 1.5–4.5)
GLUCOSE SERPL-MCNC: 217 MG/DL (ref 65–99)
GLUCOSE UR QL: ABNORMAL
HBA1C MFR BLD: 6.2 % (ref 4.8–5.6)
HDLC SERPL-MCNC: 35 MG/DL
HGB UR QL STRIP: NEGATIVE
KETONES UR QL STRIP: NEGATIVE
LDLC SERPL CALC-MCNC: 79 MG/DL (ref 0–99)
LEUKOCYTE ESTERASE UR QL STRIP: NEGATIVE
MICRO URNS: ABNORMAL
NITRITE UR QL STRIP: NEGATIVE
PH UR STRIP: 5.5 [PH] (ref 5–7.5)
POTASSIUM SERPL-SCNC: 5.2 MMOL/L (ref 3.5–5.2)
PROT SERPL-MCNC: 6.6 G/DL (ref 6–8.5)
PROT UR QL STRIP: NEGATIVE
SODIUM SERPL-SCNC: 140 MMOL/L (ref 134–144)
SP GR UR: 1.03 (ref 1–1.03)
TRIGL SERPL-MCNC: 165 MG/DL (ref 0–149)
TSH SERPL DL<=0.005 MIU/L-ACNC: 1.08 UIU/ML (ref 0.45–4.5)
UROBILINOGEN UR STRIP-MCNC: 0.2 MG/DL (ref 0.2–1)
VLDLC SERPL CALC-MCNC: 29 MG/DL (ref 5–40)

## 2021-05-18 ENCOUNTER — OFFICE VISIT (OUTPATIENT)
Dept: PHYSICAL MEDICINE AND REHAB | Facility: CLINIC | Age: 46
End: 2021-05-18
Payer: COMMERCIAL

## 2021-05-18 VITALS
HEIGHT: 67 IN | WEIGHT: 262 LBS | HEART RATE: 70 BPM | DIASTOLIC BLOOD PRESSURE: 77 MMHG | SYSTOLIC BLOOD PRESSURE: 111 MMHG | BODY MASS INDEX: 41.12 KG/M2

## 2021-05-18 DIAGNOSIS — M47.816 LUMBAR SPONDYLOSIS: ICD-10-CM

## 2021-05-18 DIAGNOSIS — G89.29 OTHER CHRONIC PAIN: ICD-10-CM

## 2021-05-18 DIAGNOSIS — M54.42 CHRONIC BILATERAL LOW BACK PAIN WITH LEFT-SIDED SCIATICA: Primary | ICD-10-CM

## 2021-05-18 DIAGNOSIS — G89.29 CHRONIC BILATERAL LOW BACK PAIN WITH LEFT-SIDED SCIATICA: Primary | ICD-10-CM

## 2021-05-18 DIAGNOSIS — M54.16 LUMBAR RADICULOPATHY: ICD-10-CM

## 2021-05-18 PROCEDURE — 99999 PR PBB SHADOW E&M-EST. PATIENT-LVL III: ICD-10-PCS | Mod: PBBFAC,,, | Performed by: PHYSICAL MEDICINE & REHABILITATION

## 2021-05-18 PROCEDURE — 99213 OFFICE O/P EST LOW 20 MIN: CPT | Mod: S$GLB,,, | Performed by: PHYSICAL MEDICINE & REHABILITATION

## 2021-05-18 PROCEDURE — 99213 PR OFFICE/OUTPT VISIT, EST, LEVL III, 20-29 MIN: ICD-10-PCS | Mod: S$GLB,,, | Performed by: PHYSICAL MEDICINE & REHABILITATION

## 2021-05-18 PROCEDURE — 99999 PR PBB SHADOW E&M-EST. PATIENT-LVL III: CPT | Mod: PBBFAC,,, | Performed by: PHYSICAL MEDICINE & REHABILITATION

## 2021-05-18 RX ORDER — GABAPENTIN 300 MG/1
300 CAPSULE ORAL 2 TIMES DAILY
Qty: 60 CAPSULE | Refills: 2 | Status: SHIPPED | OUTPATIENT
Start: 2021-05-18 | End: 2021-10-15 | Stop reason: SDUPTHER

## 2021-10-15 ENCOUNTER — LAB VISIT (OUTPATIENT)
Dept: LAB | Facility: HOSPITAL | Age: 46
End: 2021-10-15
Attending: FAMILY MEDICINE
Payer: COMMERCIAL

## 2021-10-15 ENCOUNTER — OFFICE VISIT (OUTPATIENT)
Dept: FAMILY MEDICINE | Facility: CLINIC | Age: 46
End: 2021-10-15
Payer: COMMERCIAL

## 2021-10-15 VITALS
DIASTOLIC BLOOD PRESSURE: 68 MMHG | HEART RATE: 65 BPM | HEIGHT: 67 IN | BODY MASS INDEX: 37.27 KG/M2 | WEIGHT: 237.44 LBS | OXYGEN SATURATION: 96 % | SYSTOLIC BLOOD PRESSURE: 118 MMHG | RESPIRATION RATE: 16 BRPM

## 2021-10-15 DIAGNOSIS — Z11.59 NEED FOR HEPATITIS C SCREENING TEST: ICD-10-CM

## 2021-10-15 DIAGNOSIS — M54.17 LUMBOSACRAL RADICULOPATHY AT L5: ICD-10-CM

## 2021-10-15 DIAGNOSIS — G47.33 OBSTRUCTIVE SLEEP APNEA SYNDROME: Primary | ICD-10-CM

## 2021-10-15 DIAGNOSIS — I10 ESSENTIAL HYPERTENSION: ICD-10-CM

## 2021-10-15 DIAGNOSIS — E78.5 DYSLIPIDEMIA: ICD-10-CM

## 2021-10-15 DIAGNOSIS — G47.00 INSOMNIA, UNSPECIFIED TYPE: ICD-10-CM

## 2021-10-15 DIAGNOSIS — Z11.4 SCREENING FOR HIV (HUMAN IMMUNODEFICIENCY VIRUS): ICD-10-CM

## 2021-10-15 DIAGNOSIS — E66.01 CLASS 3 SEVERE OBESITY WITH BODY MASS INDEX (BMI) OF 40.0 TO 44.9 IN ADULT, UNSPECIFIED OBESITY TYPE, UNSPECIFIED WHETHER SERIOUS COMORBIDITY PRESENT: ICD-10-CM

## 2021-10-15 DIAGNOSIS — G43.909 MIGRAINE WITHOUT STATUS MIGRAINOSUS, NOT INTRACTABLE, UNSPECIFIED MIGRAINE TYPE: ICD-10-CM

## 2021-10-15 DIAGNOSIS — K21.9 GASTROESOPHAGEAL REFLUX DISEASE, UNSPECIFIED WHETHER ESOPHAGITIS PRESENT: ICD-10-CM

## 2021-10-15 LAB
ALBUMIN SERPL BCP-MCNC: 4.3 G/DL (ref 3.5–5.2)
ALP SERPL-CCNC: 81 U/L (ref 55–135)
ALT SERPL W/O P-5'-P-CCNC: 54 U/L (ref 10–44)
ANION GAP SERPL CALC-SCNC: 12 MMOL/L (ref 8–16)
AST SERPL-CCNC: 30 U/L (ref 10–40)
BASOPHILS # BLD AUTO: 0.03 K/UL (ref 0–0.2)
BASOPHILS NFR BLD: 0.5 % (ref 0–1.9)
BILIRUB SERPL-MCNC: 0.8 MG/DL (ref 0.1–1)
BUN SERPL-MCNC: 16 MG/DL (ref 6–20)
CALCIUM SERPL-MCNC: 10.2 MG/DL (ref 8.7–10.5)
CHLORIDE SERPL-SCNC: 104 MMOL/L (ref 95–110)
CO2 SERPL-SCNC: 21 MMOL/L (ref 23–29)
CREAT SERPL-MCNC: 1 MG/DL (ref 0.5–1.4)
DIFFERENTIAL METHOD: ABNORMAL
EOSINOPHIL # BLD AUTO: 0.1 K/UL (ref 0–0.5)
EOSINOPHIL NFR BLD: 1.1 % (ref 0–8)
ERYTHROCYTE [DISTWIDTH] IN BLOOD BY AUTOMATED COUNT: 12.5 % (ref 11.5–14.5)
EST. GFR  (AFRICAN AMERICAN): >60 ML/MIN/1.73 M^2
EST. GFR  (NON AFRICAN AMERICAN): >60 ML/MIN/1.73 M^2
ESTIMATED AVG GLUCOSE: 111 MG/DL (ref 68–131)
FOLATE SERPL-MCNC: 6.8 NG/ML (ref 4–24)
GLUCOSE SERPL-MCNC: 97 MG/DL (ref 70–110)
HBA1C MFR BLD: 5.5 % (ref 4–5.6)
HCT VFR BLD AUTO: 44.9 % (ref 40–54)
HGB BLD-MCNC: 15.1 G/DL (ref 14–18)
IMM GRANULOCYTES # BLD AUTO: 0.05 K/UL (ref 0–0.04)
IMM GRANULOCYTES NFR BLD AUTO: 0.8 % (ref 0–0.5)
LYMPHOCYTES # BLD AUTO: 1.6 K/UL (ref 1–4.8)
LYMPHOCYTES NFR BLD: 25.8 % (ref 18–48)
MCH RBC QN AUTO: 30.5 PG (ref 27–31)
MCHC RBC AUTO-ENTMCNC: 33.6 G/DL (ref 32–36)
MCV RBC AUTO: 91 FL (ref 82–98)
MONOCYTES # BLD AUTO: 0.5 K/UL (ref 0.3–1)
MONOCYTES NFR BLD: 8.2 % (ref 4–15)
NEUTROPHILS # BLD AUTO: 4.1 K/UL (ref 1.8–7.7)
NEUTROPHILS NFR BLD: 63.6 % (ref 38–73)
NRBC BLD-RTO: 0 /100 WBC
PLATELET # BLD AUTO: 236 K/UL (ref 150–450)
PMV BLD AUTO: 10.7 FL (ref 9.2–12.9)
POTASSIUM SERPL-SCNC: 4 MMOL/L (ref 3.5–5.1)
PROT SERPL-MCNC: 7.6 G/DL (ref 6–8.4)
RBC # BLD AUTO: 4.95 M/UL (ref 4.6–6.2)
SODIUM SERPL-SCNC: 137 MMOL/L (ref 136–145)
TSH SERPL DL<=0.005 MIU/L-ACNC: 1.06 UIU/ML (ref 0.4–4)
VIT B12 SERPL-MCNC: 756 PG/ML (ref 210–950)
WBC # BLD AUTO: 6.36 K/UL (ref 3.9–12.7)

## 2021-10-15 PROCEDURE — 99204 PR OFFICE/OUTPT VISIT, NEW, LEVL IV, 45-59 MIN: ICD-10-PCS | Mod: S$GLB,,, | Performed by: FAMILY MEDICINE

## 2021-10-15 PROCEDURE — 83036 HEMOGLOBIN GLYCOSYLATED A1C: CPT | Performed by: FAMILY MEDICINE

## 2021-10-15 PROCEDURE — 99999 PR PBB SHADOW E&M-EST. PATIENT-LVL IV: CPT | Mod: PBBFAC,,, | Performed by: FAMILY MEDICINE

## 2021-10-15 PROCEDURE — 99999 PR PBB SHADOW E&M-EST. PATIENT-LVL IV: ICD-10-PCS | Mod: PBBFAC,,, | Performed by: FAMILY MEDICINE

## 2021-10-15 PROCEDURE — 82607 VITAMIN B-12: CPT | Performed by: FAMILY MEDICINE

## 2021-10-15 PROCEDURE — 99204 OFFICE O/P NEW MOD 45 MIN: CPT | Mod: S$GLB,,, | Performed by: FAMILY MEDICINE

## 2021-10-15 PROCEDURE — 85025 COMPLETE CBC W/AUTO DIFF WBC: CPT | Performed by: FAMILY MEDICINE

## 2021-10-15 PROCEDURE — 86803 HEPATITIS C AB TEST: CPT | Performed by: FAMILY MEDICINE

## 2021-10-15 PROCEDURE — 80053 COMPREHEN METABOLIC PANEL: CPT | Performed by: FAMILY MEDICINE

## 2021-10-15 PROCEDURE — 36415 COLL VENOUS BLD VENIPUNCTURE: CPT | Mod: PO | Performed by: FAMILY MEDICINE

## 2021-10-15 PROCEDURE — 84425 ASSAY OF VITAMIN B-1: CPT | Performed by: FAMILY MEDICINE

## 2021-10-15 PROCEDURE — 87389 HIV-1 AG W/HIV-1&-2 AB AG IA: CPT | Performed by: FAMILY MEDICINE

## 2021-10-15 PROCEDURE — 84443 ASSAY THYROID STIM HORMONE: CPT | Performed by: FAMILY MEDICINE

## 2021-10-15 PROCEDURE — 84207 ASSAY OF VITAMIN B-6: CPT | Performed by: FAMILY MEDICINE

## 2021-10-15 PROCEDURE — 82746 ASSAY OF FOLIC ACID SERUM: CPT | Performed by: FAMILY MEDICINE

## 2021-10-15 RX ORDER — LISINOPRIL AND HYDROCHLOROTHIAZIDE 12.5; 2 MG/1; MG/1
1 TABLET ORAL DAILY
Qty: 90 TABLET | Refills: 3 | Status: SHIPPED | OUTPATIENT
Start: 2021-10-15 | End: 2021-11-09

## 2021-10-15 RX ORDER — OMEPRAZOLE 20 MG/1
20 TABLET, DELAYED RELEASE ORAL DAILY
Qty: 90 TABLET | Refills: 3 | Status: SHIPPED | OUTPATIENT
Start: 2021-10-15

## 2021-10-15 RX ORDER — TRAZODONE HYDROCHLORIDE 50 MG/1
50 TABLET ORAL NIGHTLY
Qty: 90 TABLET | Refills: 1 | Status: SHIPPED | OUTPATIENT
Start: 2021-10-15 | End: 2022-02-10

## 2021-10-15 RX ORDER — GABAPENTIN 300 MG/1
300 CAPSULE ORAL 2 TIMES DAILY
Qty: 60 CAPSULE | Refills: 2 | Status: SHIPPED | OUTPATIENT
Start: 2021-10-15

## 2021-10-15 RX ORDER — ATORVASTATIN CALCIUM 20 MG/1
20 TABLET, FILM COATED ORAL NIGHTLY
Qty: 90 TABLET | Refills: 0 | Status: SHIPPED | OUTPATIENT
Start: 2021-10-15 | End: 2021-11-09

## 2021-10-18 LAB
HCV AB SERPL QL IA: NEGATIVE
HIV 1+2 AB+HIV1 P24 AG SERPL QL IA: NEGATIVE

## 2021-10-19 ENCOUNTER — PATIENT MESSAGE (OUTPATIENT)
Dept: PULMONOLOGY | Facility: CLINIC | Age: 46
End: 2021-10-19

## 2021-10-19 ENCOUNTER — OFFICE VISIT (OUTPATIENT)
Dept: PULMONOLOGY | Facility: CLINIC | Age: 46
End: 2021-10-19
Payer: COMMERCIAL

## 2021-10-19 ENCOUNTER — HOSPITAL ENCOUNTER (OUTPATIENT)
Dept: RADIOLOGY | Facility: HOSPITAL | Age: 46
Discharge: HOME | End: 2021-10-19
Attending: NURSE PRACTITIONER
Payer: COMMERCIAL

## 2021-10-19 VITALS
HEART RATE: 63 BPM | TEMPERATURE: 98 F | DIASTOLIC BLOOD PRESSURE: 74 MMHG | SYSTOLIC BLOOD PRESSURE: 113 MMHG | HEIGHT: 67 IN | OXYGEN SATURATION: 99 % | WEIGHT: 238.88 LBS | BODY MASS INDEX: 37.49 KG/M2

## 2021-10-19 DIAGNOSIS — R09.89 CHRONIC SINUS COMPLAINTS: ICD-10-CM

## 2021-10-19 DIAGNOSIS — R05.3 PERSISTENT COUGH: Primary | ICD-10-CM

## 2021-10-19 DIAGNOSIS — G47.33 OBSTRUCTIVE SLEEP APNEA SYNDROME: ICD-10-CM

## 2021-10-19 DIAGNOSIS — R05.3 PERSISTENT COUGH: ICD-10-CM

## 2021-10-19 PROCEDURE — 71046 X-RAY EXAM CHEST 2 VIEWS: CPT | Mod: 26,,, | Performed by: RADIOLOGY

## 2021-10-19 PROCEDURE — 99999 PR PBB SHADOW E&M-EST. PATIENT-LVL IV: ICD-10-PCS | Mod: PBBFAC,,, | Performed by: NURSE PRACTITIONER

## 2021-10-19 PROCEDURE — 99999 PR PBB SHADOW E&M-EST. PATIENT-LVL IV: CPT | Mod: PBBFAC,,, | Performed by: NURSE PRACTITIONER

## 2021-10-19 PROCEDURE — 99204 OFFICE O/P NEW MOD 45 MIN: CPT | Mod: S$GLB,,, | Performed by: NURSE PRACTITIONER

## 2021-10-19 PROCEDURE — 71046 XR CHEST PA AND LATERAL: ICD-10-PCS | Mod: 26,,, | Performed by: RADIOLOGY

## 2021-10-19 PROCEDURE — 99204 PR OFFICE/OUTPT VISIT, NEW, LEVL IV, 45-59 MIN: ICD-10-PCS | Mod: S$GLB,,, | Performed by: NURSE PRACTITIONER

## 2021-10-19 PROCEDURE — 71046 X-RAY EXAM CHEST 2 VIEWS: CPT | Mod: TC,FY

## 2021-10-19 RX ORDER — MONTELUKAST SODIUM 10 MG/1
10 TABLET ORAL NIGHTLY
Qty: 90 TABLET | Refills: 3 | Status: SHIPPED | OUTPATIENT
Start: 2021-10-19 | End: 2021-11-18

## 2021-10-19 RX ORDER — OMEPRAZOLE 20 MG/1
20 CAPSULE, DELAYED RELEASE ORAL DAILY
COMMUNITY
Start: 2021-10-15 | End: 2023-08-18

## 2021-10-19 RX ORDER — ALBUTEROL SULFATE 90 UG/1
2 AEROSOL, METERED RESPIRATORY (INHALATION) EVERY 4 HOURS PRN
Qty: 18 G | Refills: 11 | Status: SHIPPED | OUTPATIENT
Start: 2021-10-19 | End: 2022-08-11 | Stop reason: SDUPTHER

## 2021-10-22 LAB
PYRIDOXAL SERPL-MCNC: 31 UG/L (ref 5–50)
VIT B1 BLD-MCNC: 67 UG/L (ref 38–122)

## 2021-11-18 ENCOUNTER — PROCEDURE VISIT (OUTPATIENT)
Dept: SLEEP MEDICINE | Facility: HOSPITAL | Age: 46
End: 2021-11-18
Attending: NURSE PRACTITIONER

## 2021-11-18 DIAGNOSIS — G47.33 OBSTRUCTIVE SLEEP APNEA SYNDROME: ICD-10-CM

## 2021-11-18 PROCEDURE — 95806 SLEEP STUDY UNATT&RESP EFFT: CPT

## 2021-12-01 DIAGNOSIS — G47.33 OBSTRUCTIVE SLEEP APNEA SYNDROME: Primary | ICD-10-CM

## 2021-12-02 ENCOUNTER — TELEPHONE (OUTPATIENT)
Dept: PULMONOLOGY | Facility: CLINIC | Age: 46
End: 2021-12-02
Payer: COMMERCIAL

## 2021-12-03 ENCOUNTER — PATIENT MESSAGE (OUTPATIENT)
Dept: PULMONOLOGY | Facility: CLINIC | Age: 46
End: 2021-12-03
Payer: COMMERCIAL

## 2021-12-30 ENCOUNTER — OFFICE VISIT (OUTPATIENT)
Dept: FAMILY MEDICINE | Facility: CLINIC | Age: 46
End: 2021-12-30
Payer: COMMERCIAL

## 2021-12-30 DIAGNOSIS — N52.9 ERECTILE DYSFUNCTION, UNSPECIFIED ERECTILE DYSFUNCTION TYPE: Primary | ICD-10-CM

## 2021-12-30 PROCEDURE — 99214 PR OFFICE/OUTPT VISIT, EST, LEVL IV, 30-39 MIN: ICD-10-PCS | Mod: 95,,, | Performed by: FAMILY MEDICINE

## 2021-12-30 PROCEDURE — 99214 OFFICE O/P EST MOD 30 MIN: CPT | Mod: 95,,, | Performed by: FAMILY MEDICINE

## 2021-12-30 RX ORDER — SILDENAFIL 50 MG/1
50 TABLET, FILM COATED ORAL DAILY PRN
Qty: 30 TABLET | Refills: 1 | Status: SHIPPED | OUTPATIENT
Start: 2021-12-30 | End: 2023-08-18

## 2022-01-01 ENCOUNTER — LAB VISIT (OUTPATIENT)
Dept: PRIMARY CARE CLINIC | Facility: OTHER | Age: 47
End: 2022-01-01
Attending: INTERNAL MEDICINE
Payer: COMMERCIAL

## 2022-01-01 ENCOUNTER — PATIENT MESSAGE (OUTPATIENT)
Dept: FAMILY MEDICINE | Facility: CLINIC | Age: 47
End: 2022-01-01
Payer: COMMERCIAL

## 2022-01-01 ENCOUNTER — PATIENT MESSAGE (OUTPATIENT)
Dept: ADMINISTRATIVE | Facility: OTHER | Age: 47
End: 2022-01-01
Payer: COMMERCIAL

## 2022-01-01 DIAGNOSIS — Z20.822 ENCOUNTER FOR LABORATORY TESTING FOR COVID-19 VIRUS: ICD-10-CM

## 2022-01-01 PROCEDURE — U0003 INFECTIOUS AGENT DETECTION BY NUCLEIC ACID (DNA OR RNA); SEVERE ACUTE RESPIRATORY SYNDROME CORONAVIRUS 2 (SARS-COV-2) (CORONAVIRUS DISEASE [COVID-19]), AMPLIFIED PROBE TECHNIQUE, MAKING USE OF HIGH THROUGHPUT TECHNOLOGIES AS DESCRIBED BY CMS-2020-01-R: HCPCS | Performed by: INTERNAL MEDICINE

## 2022-01-02 ENCOUNTER — PATIENT MESSAGE (OUTPATIENT)
Dept: ADMINISTRATIVE | Facility: OTHER | Age: 47
End: 2022-01-02
Payer: COMMERCIAL

## 2022-01-03 NOTE — PROGRESS NOTES
The patient location is:  [place]  The chief complaint leading to consultation is: ED  Visit type: Virtual visit with synchronous audio and video  Total time spent with patient:  Less than 30 minutes  Each patient to whom he or she provides medical services by telemedicine is:  (1) informed of the relationship between the physician and patient and the respective role of any other health care provider with respect to management of the patient; and (2) notified that he or she may decline to receive medical services by telemedicine and may withdraw from such care at any time. Vital signs recorded were provided by the patient.    Notes:  See below    Subjective:   Patient ID: Jai Watkins III is a 46 y.o. male     Chief Complaint: ED    Pt reports issues with ED. Going no for months. States in past he has taken viagra with assistance in symptoms. Would like to get presription    Review of Systems   Constitutional: Negative for activity change and unexpected weight change.   HENT: Negative for hearing loss, rhinorrhea and trouble swallowing.    Eyes: Negative for discharge and visual disturbance.   Respiratory: Negative for chest tightness and wheezing.    Cardiovascular: Negative for chest pain and palpitations.   Gastrointestinal: Negative for blood in stool, constipation, diarrhea and vomiting.   Endocrine: Negative for polydipsia and polyuria.   Genitourinary: Negative for difficulty urinating, hematuria and urgency.   Musculoskeletal: Negative for arthralgias, joint swelling and neck pain.   Neurological: Negative for weakness and headaches.   Psychiatric/Behavioral: Negative for confusion and dysphoric mood.     Past Medical History:   Diagnosis Date    Anxiety     Depression     Eczema 10/30/2013    Hyperlipidemia LDL goal < 130     Hypertension     takes Metoprolol, also helps with palpitations    Recurrent upper respiratory infection (URI)     Sleep apnea     resolved with UPPP     Past Surgical  History:   Procedure Laterality Date    ADENOIDECTOMY      ANAL FISTULOTOMY      CARPAL TUNNEL RELEASE Bilateral     ROTATOR CUFF REPAIR  2011    right    SINUS SURGERY  2011    TONSILLECTOMY      UVULOPALATOPHARYNGOPLASTY  2011     Objective:   There were no vitals filed for this visit.  There is no height or weight on file to calculate BMI.  Physical Exam  Vitals and nursing note reviewed.   Constitutional:       Appearance: He is well-developed and well-nourished.   HENT:      Head: Normocephalic and atraumatic.   Eyes:      General: No scleral icterus.     Conjunctiva/sclera: Conjunctivae normal.   Pulmonary:      Effort: Pulmonary effort is normal. No respiratory distress.   Musculoskeletal:         General: No deformity. Normal range of motion.      Cervical back: Normal range of motion and neck supple.   Skin:     Coloration: Skin is not pale.      Findings: No rash.   Neurological:      Mental Status: He is alert and oriented to person, place, and time.   Psychiatric:         Mood and Affect: Mood and affect normal.         Behavior: Behavior normal.         Thought Content: Thought content normal.         Judgment: Judgment normal.       Assessment:     1. Erectile dysfunction, unspecified erectile dysfunction type      Plan:   Erectile dysfunction, unspecified erectile dysfunction type  -     sildenafiL (VIAGRA) 50 MG tablet; Take 1 tablet (50 mg total) by mouth daily as needed for Erectile Dysfunction.  Dispense: 30 tablet; Refill: 1  Counseled at length risks of medication. Advised close f/u with any issues          Total time spent of Less than 30 minutes minutes on the day of the visit.This includes face to face time and preparing to see the patient, obtaining and reviewing separately obtained history, documenting clinical information in the electronic or other health record, independently interpreting results and communicating results to the patient/family/caregiver, or care  coordinator.    Thomas Jensen MD  01/03/2022    Portions of this note have been dictated with M Mercy.

## 2022-01-04 ENCOUNTER — PATIENT MESSAGE (OUTPATIENT)
Dept: ADMINISTRATIVE | Facility: OTHER | Age: 47
End: 2022-01-04
Payer: COMMERCIAL

## 2022-01-04 ENCOUNTER — PATIENT MESSAGE (OUTPATIENT)
Dept: FAMILY MEDICINE | Facility: CLINIC | Age: 47
End: 2022-01-04
Payer: COMMERCIAL

## 2022-01-05 DIAGNOSIS — U07.1 COVID-19 VIRUS DETECTED: ICD-10-CM

## 2022-01-05 LAB
SARS-COV-2 RNA RESP QL NAA+PROBE: DETECTED
TEST PERFORMANCE INFO SPEC: ABNORMAL

## 2022-01-11 ENCOUNTER — PATIENT MESSAGE (OUTPATIENT)
Dept: PULMONOLOGY | Facility: CLINIC | Age: 47
End: 2022-01-11
Payer: COMMERCIAL

## 2022-02-18 ENCOUNTER — TELEPHONE (OUTPATIENT)
Dept: FAMILY MEDICINE | Facility: CLINIC | Age: 47
End: 2022-02-18

## 2022-02-18 ENCOUNTER — LAB VISIT (OUTPATIENT)
Dept: LAB | Facility: HOSPITAL | Age: 47
End: 2022-02-18
Attending: FAMILY MEDICINE
Payer: COMMERCIAL

## 2022-02-18 ENCOUNTER — PATIENT MESSAGE (OUTPATIENT)
Dept: FAMILY MEDICINE | Facility: CLINIC | Age: 47
End: 2022-02-18

## 2022-02-18 ENCOUNTER — OFFICE VISIT (OUTPATIENT)
Dept: FAMILY MEDICINE | Facility: CLINIC | Age: 47
End: 2022-02-18
Payer: COMMERCIAL

## 2022-02-18 VITALS
WEIGHT: 229.06 LBS | HEART RATE: 69 BPM | OXYGEN SATURATION: 98 % | DIASTOLIC BLOOD PRESSURE: 68 MMHG | BODY MASS INDEX: 35.95 KG/M2 | SYSTOLIC BLOOD PRESSURE: 112 MMHG | HEIGHT: 67 IN | TEMPERATURE: 98 F

## 2022-02-18 DIAGNOSIS — R29.90 COVID-19 LONG HAULER MANIFESTING CHRONIC NEUROLOGIC SYMPTOMS: Primary | ICD-10-CM

## 2022-02-18 DIAGNOSIS — Z12.11 COLON CANCER SCREENING: ICD-10-CM

## 2022-02-18 DIAGNOSIS — N52.9 ERECTILE DYSFUNCTION, UNSPECIFIED ERECTILE DYSFUNCTION TYPE: ICD-10-CM

## 2022-02-18 DIAGNOSIS — R51.9 NEW ONSET OF HEADACHES: ICD-10-CM

## 2022-02-18 DIAGNOSIS — H53.8 BLURRY VISION: ICD-10-CM

## 2022-02-18 DIAGNOSIS — U09.9 COVID-19 LONG HAULER MANIFESTING CHRONIC NEUROLOGIC SYMPTOMS: Primary | ICD-10-CM

## 2022-02-18 DIAGNOSIS — I10 PRIMARY HYPERTENSION: ICD-10-CM

## 2022-02-18 PROCEDURE — 99999 PR PBB SHADOW E&M-EST. PATIENT-LVL V: CPT | Mod: PBBFAC,,, | Performed by: FAMILY MEDICINE

## 2022-02-18 PROCEDURE — 36415 COLL VENOUS BLD VENIPUNCTURE: CPT | Mod: PO | Performed by: FAMILY MEDICINE

## 2022-02-18 PROCEDURE — 99214 PR OFFICE/OUTPT VISIT, EST, LEVL IV, 30-39 MIN: ICD-10-PCS | Mod: S$GLB,,, | Performed by: FAMILY MEDICINE

## 2022-02-18 PROCEDURE — 82040 ASSAY OF SERUM ALBUMIN: CPT | Performed by: FAMILY MEDICINE

## 2022-02-18 PROCEDURE — 99214 OFFICE O/P EST MOD 30 MIN: CPT | Mod: S$GLB,,, | Performed by: FAMILY MEDICINE

## 2022-02-18 PROCEDURE — 99999 PR PBB SHADOW E&M-EST. PATIENT-LVL V: ICD-10-PCS | Mod: PBBFAC,,, | Performed by: FAMILY MEDICINE

## 2022-02-18 RX ORDER — ERGOCALCIFEROL 1.25 MG/1
50000 CAPSULE ORAL
Qty: 12 CAPSULE | Refills: 0 | Status: SHIPPED | OUTPATIENT
Start: 2022-02-18 | End: 2022-05-07

## 2022-02-18 NOTE — PROGRESS NOTES
Subjective:       Patient ID: Jai Watkins III is a 46 y.o. male.    Chief Complaint: Follow-up (3 month f/u/)    Patient is a 46 year old male with history of depressive disorder, generalized anxiety disorder, hypertension, new onset migraines in the last year dyslipidemia presenting for routine health maintenance and follow up, patient endorses compliance with medication regimen patient was previously did prediabetic but he control this with diet and exercise.  Patient continues to endorse headaches.  He was not able to the make his neurology appointment that we referred him to because he states no one contacted him to set appointment.  Patient states that he has light sensitive headaches and he gets blurry vision with those headaches especially with artificial light at places like [employer] [address].  Patient's blood pressure is well controlled at this visit.  He states that he is tolerating sildenafil well for his erectile dysfunction but is wondering what his testosterone levels are.  He states these headaches and blurry vision have gotten worse after he got COVID in January.          Current Outpatient Medications:     albuterol (VENTOLIN HFA) 90 mcg/actuation inhaler, Inhale 2 puffs into the lungs every 4 (four) hours as needed for Wheezing or Shortness of Breath. Rescue, Disp: 18 g, Rfl: 11    atorvastatin (LIPITOR) 20 MG tablet, TAKE 1 TABLET(20 MG) BY MOUTH EVERY EVENING, Disp: 90 tablet, Rfl: 0    augmented betamethasone dipropionate (DIPROLENE-AF) 0.05 % cream, Apply topically 2 (two) times daily., Disp: 15 g, Rfl: 1    diclofenac sodium (VOLTAREN) 1 % Gel, Apply 2 g topically 4 (four) times daily as needed (pain)., Disp: 100 g, Rfl: 0    gabapentin (NEURONTIN) 300 MG capsule, Take 1 capsule (300 mg total) by mouth 2 (two) times daily., Disp: 60 capsule, Rfl: 2    hydrocortisone (ANUSOL-HC) 2.5 % rectal cream, Place rectally 2 (two) times daily., Disp: 28 g, Rfl: 1    lisinopriL-hydrochlorothiazide  (PRINZIDE,ZESTORETIC) 20-12.5 mg per tablet, TAKE 1 TABLET BY MOUTH EVERY DAY, Disp: 90 tablet, Rfl: 3    omeprazole (PRILOSEC OTC) 20 MG tablet, Take 1 tablet (20 mg total) by mouth once daily., Disp: 90 tablet, Rfl: 3    omeprazole (PRILOSEC) 20 MG capsule, Take 20 mg by mouth once daily., Disp: , Rfl:     sildenafiL (VIAGRA) 50 MG tablet, Take 1 tablet (50 mg total) by mouth daily as needed for Erectile Dysfunction., Disp: 30 tablet, Rfl: 1    traZODone (DESYREL) 50 MG tablet, TAKE 1 TABLET(50 MG) BY MOUTH EVERY EVENING, Disp: 90 tablet, Rfl: 1    ergocalciferol (ERGOCALCIFEROL) 50,000 unit Cap, Take 1 capsule (50,000 Units total) by mouth every 7 days. for 12 doses, Disp: 12 capsule, Rfl: 0    Review of patient's allergies indicates:   Allergen Reactions    Levofloxacin Shortness Of Breath and Hives    Quinolones Hives and Shortness Of Breath       Past Medical History:   Diagnosis Date    Anxiety     Depression     Eczema 10/30/2013    Hyperlipidemia LDL goal < 130     Hypertension     takes Metoprolol, also helps with palpitations    Recurrent upper respiratory infection (URI)     Sleep apnea     resolved with UPPP       Past Surgical History:   Procedure Laterality Date    ADENOIDECTOMY      ANAL FISTULOTOMY      CARPAL TUNNEL RELEASE Bilateral     ROTATOR CUFF REPAIR  2011    right    SINUS SURGERY  2011    TONSILLECTOMY      UVULOPALATOPHARYNGOPLASTY  2011       Family History   Problem Relation Age of Onset    Hypertension Mother     Heart disease Mother 55        CAD, CABG    Diabetes Mother     Heart attack Mother     Arthritis Mother     Depression Mother     Hypertension Father     Arthritis Father     Kidney disease Father     Depression Father     Hearing loss Father     Other Father         elevated PSA but negative serial biopsies    Allergic rhinitis Sister     Allergies Sister     Angioedema Sister     Asthma Neg Hx     Eczema Neg Hx     Immunodeficiency Neg  "Hx        Social History     Tobacco Use    Smoking status: Never Smoker    Smokeless tobacco: Never Used   Substance Use Topics    Alcohol use: Yes    Drug use: Not Currently     Types: Benzodiazepines, Hydrocodone       Review of Systems   Constitutional: Negative for activity change, appetite change, chills, diaphoresis, fatigue, fever and unexpected weight change.   HENT: Negative for hearing loss, postnasal drip, rhinorrhea, sinus pressure/congestion, sore throat and trouble swallowing.    Eyes: Negative for visual disturbance.   Respiratory: Negative for apnea, chest tightness, shortness of breath and wheezing.    Cardiovascular: Negative for chest pain and palpitations.   Gastrointestinal: Negative for abdominal pain, blood in stool, change in bowel habit, constipation, diarrhea, nausea, vomiting and change in bowel habit.   Endocrine: Negative for polydipsia and polyphagia.   Genitourinary: Negative for dysuria, enuresis, flank pain, frequency and urgency.   Integumentary:  Negative for rash and mole/lesion.   Neurological: Positive for headaches. Negative for dizziness, light-headedness and memory loss.   Psychiatric/Behavioral: Negative for confusion, decreased concentration, sleep disturbance and suicidal ideas. The patient is not nervous/anxious.            Objective:      Vitals:    02/18/22 1029   BP: 112/68   BP Location: Right arm   Patient Position: Sitting   BP Method: Large (Manual)   Pulse: 69   Temp: 97.7 °F (36.5 °C)   TempSrc: Oral   SpO2: 98%   Weight: 103.9 kg (229 lb 0.9 oz)   Height: 5' 7" (1.702 m)     Physical Exam  Constitutional:       General: He is not in acute distress.     Appearance: He is obese. He is not ill-appearing, toxic-appearing or diaphoretic.   HENT:      Head: Normocephalic and atraumatic.      Nose: Nose normal. No congestion or rhinorrhea.      Mouth/Throat:      Mouth: Mucous membranes are moist.      Pharynx: No oropharyngeal exudate or posterior oropharyngeal " erythema.   Eyes:      General: No scleral icterus.        Right eye: No discharge.         Left eye: No discharge.      Conjunctiva/sclera: Conjunctivae normal.      Pupils: Pupils are equal, round, and reactive to light.   Cardiovascular:      Rate and Rhythm: Normal rate and regular rhythm.      Pulses: Normal pulses.      Heart sounds: Normal heart sounds. No murmur heard.  No friction rub. No gallop.    Pulmonary:      Effort: Pulmonary effort is normal. No respiratory distress.      Breath sounds: Normal breath sounds. No stridor. No wheezing, rhonchi or rales.   Chest:      Chest wall: No tenderness.   Abdominal:      General: Abdomen is flat. Bowel sounds are normal. There is no distension.      Palpations: Abdomen is soft. There is no mass.      Tenderness: There is no abdominal tenderness. There is no guarding or rebound.      Hernia: No hernia is present.   Musculoskeletal:      Cervical back: No rigidity or tenderness.   Neurological:      Mental Status: He is alert.           Assessment:       1. COVID-19 long hauler manifesting chronic neurologic symptoms    2. New onset of headaches    3. Colon cancer screening    4. Primary hypertension    5. Erectile dysfunction, unspecified erectile dysfunction type    6. Blurry vision        Plan:       COVID-19 long hauler manifesting chronic neurologic symptoms  Please take   3000mg quercetin dihydrate three times daily for 20 days  50mg zinc daily for ten days  1000mg vitamin c daily for ten days  36226bb vitamin d2 once weekly for 4 weeks.   Pepcid 40mg twice daily for ten days  Claudia root in green tea with honey and lemon , three cups daily      Hypertension  Continue current medication as prescribed.       ORDERS:    COVID-19 long hauler manifesting chronic neurologic symptoms  -     ergocalciferol (ERGOCALCIFEROL) 50,000 unit Cap; Take 1 capsule (50,000 Units total) by mouth every 7 days. for 12 doses  Dispense: 12 capsule; Refill: 0    New onset of  headaches  -     Ambulatory referral/consult to Neurology; Future; Expected date: 02/25/2022  -     MRI Brain Without Contrast; Future; Expected date: 02/18/2022    Colon cancer screening  -     Ambulatory referral/consult to Gastroenterology; Future; Expected date: 02/25/2022    Primary hypertension    Erectile dysfunction, unspecified erectile dysfunction type  -     TESTOSTERONE PANEL; Future; Expected date: 02/18/2022    Blurry vision  -     Ambulatory referral/consult to Ophthalmology; Future; Expected date: 02/25/2022        Follow up in about 6 months (around 8/18/2022).    Brianne Lei MD           DISCLAIMER: This note was prepared with [employer] Naturally Speaking voice recognition transcription software. Garbled syntax, mangled pronouns, and other bizarre constructions may be attributed to that software system.

## 2022-02-18 NOTE — PATIENT INSTRUCTIONS
If you are due for any health screening(s) below please notify me so we can arrange them to be ordered and scheduled to maintain your health.     Health Maintenance   Topic Date Due    TETANUS VACCINE  04/08/2026    Lipid Panel  05/08/2026    Hepatitis C Screening  Completed          Colon Cancer Screening    Of cancers affecting both men and women, colorectal cancer is the third leading cancer killer in the [place]. But it doesnt have to be. Screening can prevent colorectal cancer or find it at an early stage when treatment often leads to a cure.    A colonoscopy is the preferred test for detecting colon cancer. It is needed only once every 10 years if results are negative. While sedated, a flexible, lighted tube with a tiny camera is inserted into the rectum and advanced through the colon to look for cancers. An alternative screening test that is used at home and returned to the lab may also be used. It detects hidden blood in bowel movements which could indicate cancer in the colon. If results are positive, you will need a colonoscopy to determine if the blood is a sign of cancer. This type of follow up (diagnostic) colonoscopy usually requires additional copays as required by your insurance provider. Please contact your PCP if you have any questions.    Although you are still overdue for this important screening, due to the COVID-19 pandemic, we recommend scheduling it in the near future.              Please take   3000mg quercetin dihydrate three times daily for 20 days (on [email or web address])   50mg zinc daily for ten days  1000mg vitamin c daily for ten days  28190gc vitamin d2 once weekly for 4 weeks.   Pepcid 40mg twice daily for ten days  Claudia root in green tea with honey and lemon , three cups daily

## 2022-02-18 NOTE — ASSESSMENT & PLAN NOTE
Please take   3000mg quercetin dihydrate three times daily for 20 days  50mg zinc daily for ten days  1000mg vitamin c daily for ten days  42552ed vitamin d2 once weekly for 4 weeks.   Pepcid 40mg twice daily for ten days  Claudia root in green tea with honey and lemon , three cups daily

## 2022-02-21 ENCOUNTER — PATIENT MESSAGE (OUTPATIENT)
Dept: FAMILY MEDICINE | Facility: CLINIC | Age: 47
End: 2022-02-21
Payer: COMMERCIAL

## 2022-02-23 LAB
ALBUMIN SERPL-MCNC: 4.7 G/DL (ref 3.6–5.1)
SHBG SERPL-SCNC: 36 NMOL/L (ref 10–50)
TESTOST FREE SERPL-MCNC: 65.4 PG/ML (ref 46–224)
TESTOST SERPL-MCNC: 524 NG/DL (ref 250–1100)
TESTOSTERONE.FREE+WB SERPL-MCNC: 140.2 NG/DL (ref 110–575)

## 2022-02-28 ENCOUNTER — PATIENT MESSAGE (OUTPATIENT)
Dept: FAMILY MEDICINE | Facility: CLINIC | Age: 47
End: 2022-02-28
Payer: COMMERCIAL

## 2022-03-02 NOTE — PROGRESS NOTES
The following message was sent to patient:        Your testosterone levels look fine and does not require any change in treatment.   Please contact me if you have any additional concerns.    Sincerely,    Brianne Lei

## 2022-03-07 NOTE — TELEPHONE ENCOUNTER
Please contact patient and let him know the following. Patient's testosterone levels are normal and do not require replacement.

## 2022-05-23 ENCOUNTER — TELEPHONE (OUTPATIENT)
Dept: GASTROENTEROLOGY | Facility: CLINIC | Age: 47
End: 2022-05-23
Payer: COMMERCIAL

## 2022-07-06 ENCOUNTER — PATIENT MESSAGE (OUTPATIENT)
Dept: ADMINISTRATIVE | Facility: HOSPITAL | Age: 47
End: 2022-07-06
Payer: COMMERCIAL

## 2022-07-06 ENCOUNTER — PATIENT OUTREACH (OUTPATIENT)
Dept: ADMINISTRATIVE | Facility: HOSPITAL | Age: 47
End: 2022-07-06
Payer: COMMERCIAL

## 2022-07-06 NOTE — PROGRESS NOTES
Colon Cancer screening GAP report- Left VM and portal message sent out regarding pt's overdue Colon Cancer screening.

## 2022-07-14 ENCOUNTER — PATIENT OUTREACH (OUTPATIENT)
Dept: ADMINISTRATIVE | Facility: HOSPITAL | Age: 47
End: 2022-07-14
Payer: COMMERCIAL

## 2022-07-14 NOTE — LETTER
July 14, 2022    Jai Watkins III  [address]  [place] [address]             [facility]  [address]  [place] [address]  Phone: [phone] Dear Mr. Watkins,    Good morning!     I am reaching out because you are over due for a Colon Cancer screening. Would you like for us to send a referral to a GI Physician so they can set this up? Please let me know.     Thanks so much and have a great day!      Lilian YOUSIF LPN [place] [address] [place] [address]  P- [phone]  F- [phone]

## 2022-07-14 NOTE — TELEPHONE ENCOUNTER
"Attempted to outreach patient for pre-visit via "[employer]", no answer after a week. Sending outreach via Mail Out Letter now.  "

## 2022-08-05 ENCOUNTER — TELEPHONE (OUTPATIENT)
Dept: FAMILY MEDICINE | Facility: CLINIC | Age: 47
End: 2022-08-05
Payer: COMMERCIAL

## 2022-08-05 NOTE — TELEPHONE ENCOUNTER
Patient states he has an area inside his left ear that has been bleeding and oozing. States he believes he may have skin cancer, and would like recommendations from Dr. Guy. Writer advised patient an appointment for evaluation would be required. Appointment scheduled for the date of 8-8-22 (first available). Patient agreed to appointment date, time, and location.

## 2022-08-05 NOTE — TELEPHONE ENCOUNTER
----- Message from Reji Cordova sent at 8/5/2022 12:04 PM CDT -----  Contact: pt at [phone]  Type: Needs Medical Advice  Who Called:  pt    Best Call Back Number: [phone]    Additional Information: pt is calling the office stating he thinks he have skin cancer inside his left ear. He wanted to get a recommendation from Dr. Guy. Please call back and advise.

## 2022-08-08 ENCOUNTER — OFFICE VISIT (OUTPATIENT)
Dept: FAMILY MEDICINE | Facility: CLINIC | Age: 47
End: 2022-08-08
Payer: COMMERCIAL

## 2022-08-08 VITALS
WEIGHT: 247.13 LBS | HEIGHT: 67 IN | DIASTOLIC BLOOD PRESSURE: 60 MMHG | RESPIRATION RATE: 20 BRPM | OXYGEN SATURATION: 98 % | BODY MASS INDEX: 38.79 KG/M2 | TEMPERATURE: 98 F | SYSTOLIC BLOOD PRESSURE: 102 MMHG | HEART RATE: 78 BPM

## 2022-08-08 DIAGNOSIS — L82.1 SK (SEBORRHEIC KERATOSIS): Primary | ICD-10-CM

## 2022-08-08 PROCEDURE — 17110 PR DESTRUCTION BENIGN LESIONS UP TO 14: ICD-10-PCS | Mod: S$GLB,,, | Performed by: PHYSICIAN ASSISTANT

## 2022-08-08 PROCEDURE — 17110 DESTRUCTION B9 LES UP TO 14: CPT | Mod: S$GLB,,, | Performed by: PHYSICIAN ASSISTANT

## 2022-08-08 PROCEDURE — 99999 PR PBB SHADOW E&M-EST. PATIENT-LVL III: ICD-10-PCS | Mod: PBBFAC,,, | Performed by: PHYSICIAN ASSISTANT

## 2022-08-08 PROCEDURE — 99213 PR OFFICE/OUTPT VISIT, EST, LEVL III, 20-29 MIN: ICD-10-PCS | Mod: 25,S$GLB,, | Performed by: PHYSICIAN ASSISTANT

## 2022-08-08 PROCEDURE — 99999 PR PBB SHADOW E&M-EST. PATIENT-LVL III: CPT | Mod: PBBFAC,,, | Performed by: PHYSICIAN ASSISTANT

## 2022-08-08 PROCEDURE — 99213 OFFICE O/P EST LOW 20 MIN: CPT | Mod: 25,S$GLB,, | Performed by: PHYSICIAN ASSISTANT

## 2022-08-08 NOTE — PROGRESS NOTES
Subjective:       Patient ID: Jai Watkins III is a 47 y.o. male.    Chief Complaint: No chief complaint on file.    HPI   Scaly are L ext ear area x several mos   Review of Systems   Constitutional: Negative.  Negative for activity change, appetite change, chills, diaphoresis, fatigue, fever and unexpected weight change.   HENT: Negative.    Eyes: Negative.    Respiratory: Negative.  Negative for cough and shortness of breath.    Cardiovascular: Negative.  Negative for chest pain and leg swelling.   Gastrointestinal: Negative.    Endocrine: Negative.    Genitourinary: Negative.    Musculoskeletal: Negative.    Integumentary:  Positive for mole/lesion. Negative.   Neurological: Negative.          Objective:      Physical Exam  Vitals reviewed.   Constitutional:       General: He is not in acute distress.     Appearance: Normal appearance. He is obese. He is not ill-appearing, toxic-appearing or diaphoretic.   HENT:      Head: Normocephalic and atraumatic.      Right Ear: External ear normal.      Ears:      Comments: 0.5 cm raised patch L ext ear  Eyes:      General: No scleral icterus.     Conjunctiva/sclera: Conjunctivae normal.   Musculoskeletal:      Cervical back: Normal range of motion and neck supple. No rigidity or tenderness.   Lymphadenopathy:      Cervical: No cervical adenopathy.   Skin:     Findings: Lesion present.      Comments: L ext scaly raised lesion ear   Neurological:      General: No focal deficit present.      Mental Status: He is alert and oriented to person, place, and time.         Assessment:       Problem List Items Addressed This Visit    None     Visit Diagnoses     SK (seborrheic keratosis)    -  Primary          Plan:       Diagnoses and all orders for this visit:    SK (seborrheic keratosis)    liq N2 applied to lesion  Pt. Tolerated procedure well  F/u check 3 wks

## 2022-08-11 DIAGNOSIS — R05.3 PERSISTENT COUGH: ICD-10-CM

## 2022-08-12 RX ORDER — ALBUTEROL SULFATE 90 UG/1
2 AEROSOL, METERED RESPIRATORY (INHALATION) EVERY 4 HOURS PRN
Qty: 18 G | Refills: 5 | Status: SHIPPED | OUTPATIENT
Start: 2022-08-12

## 2022-08-23 ENCOUNTER — OFFICE VISIT (OUTPATIENT)
Dept: FAMILY MEDICINE | Facility: CLINIC | Age: 47
End: 2022-08-23
Payer: COMMERCIAL

## 2022-08-23 VITALS
HEIGHT: 67 IN | WEIGHT: 246.94 LBS | BODY MASS INDEX: 38.76 KG/M2 | HEART RATE: 63 BPM | DIASTOLIC BLOOD PRESSURE: 74 MMHG | SYSTOLIC BLOOD PRESSURE: 102 MMHG | OXYGEN SATURATION: 95 %

## 2022-08-23 DIAGNOSIS — L30.9 ECZEMA, UNSPECIFIED TYPE: Primary | ICD-10-CM

## 2022-08-23 PROCEDURE — 99213 PR OFFICE/OUTPT VISIT, EST, LEVL III, 20-29 MIN: ICD-10-PCS | Mod: S$GLB,,, | Performed by: STUDENT IN AN ORGANIZED HEALTH CARE EDUCATION/TRAINING PROGRAM

## 2022-08-23 PROCEDURE — 99999 PR PBB SHADOW E&M-EST. PATIENT-LVL IV: CPT | Mod: PBBFAC,,, | Performed by: STUDENT IN AN ORGANIZED HEALTH CARE EDUCATION/TRAINING PROGRAM

## 2022-08-23 PROCEDURE — 99213 OFFICE O/P EST LOW 20 MIN: CPT | Mod: S$GLB,,, | Performed by: STUDENT IN AN ORGANIZED HEALTH CARE EDUCATION/TRAINING PROGRAM

## 2022-08-23 PROCEDURE — 99999 PR PBB SHADOW E&M-EST. PATIENT-LVL IV: ICD-10-PCS | Mod: PBBFAC,,, | Performed by: STUDENT IN AN ORGANIZED HEALTH CARE EDUCATION/TRAINING PROGRAM

## 2022-08-23 RX ORDER — HYDROXYZINE HYDROCHLORIDE 25 MG/1
25 TABLET, FILM COATED ORAL NIGHTLY PRN
Qty: 30 TABLET | Refills: 2 | Status: SHIPPED | OUTPATIENT
Start: 2022-08-23 | End: 2022-11-18

## 2022-08-23 RX ORDER — TRIAMCINOLONE ACETONIDE 1 MG/G
OINTMENT TOPICAL 2 TIMES DAILY
Qty: 80 G | Refills: 2 | Status: SHIPPED | OUTPATIENT
Start: 2022-08-23 | End: 2022-09-06

## 2022-08-23 NOTE — PROGRESS NOTES
[facility] MEDICINE CLINIC NOTE    Patient Name: Jai Watkins III  YOB: 1975    PRESENTING HISTORY   Chief Complaint:   Chief Complaint   Patient presents with    Psoriasis     Has been using Cortizone cream - states it's getting worse        History of Present Illness:  Mr. Jai Watkins III is a 47 y.o. male     Isolated right arm itching.   Onset few years, worsening recently.   Tried cortisone, talcinex without sig relief.   No other areas involved, only volar aspect of proximal lower arm.                                              Review of Systems   Skin: Positive for itching and rash.         PAST HISTORY:     Past Medical History:   Diagnosis Date    Anxiety     Depression     Eczema 10/30/2013    Hyperlipidemia LDL goal < 130     Hypertension     takes Metoprolol, also helps with palpitations    Recurrent upper respiratory infection (URI)     Sleep apnea     resolved with UPPP       Past Surgical History:   Procedure Laterality Date    ADENOIDECTOMY      ANAL FISTULOTOMY      CARPAL TUNNEL RELEASE Bilateral     ROTATOR CUFF REPAIR  2011    right    SINUS SURGERY  2011    TONSILLECTOMY      UVULOPALATOPHARYNGOPLASTY  2011       Family History   Problem Relation Age of Onset    Hypertension Mother     Heart disease Mother 55        CAD, CABG    Diabetes Mother     Heart attack Mother     Arthritis Mother     Depression Mother     Hypertension Father     Arthritis Father     Kidney disease Father     Depression Father     Hearing loss Father     Other Father         elevated PSA but negative serial biopsies    Allergic rhinitis Sister     Allergies Sister     Angioedema Sister     Asthma Neg Hx     Eczema Neg Hx     Immunodeficiency Neg Hx        Social History     Socioeconomic History    Marital status: Single   Occupational History    Occupation: horticuturalist     Employer: [employer]   Tobacco Use    Smoking status: Never Smoker  "   Smokeless tobacco: Never Used   Substance and Sexual Activity    Alcohol use: Yes    Drug use: Not Currently     Types: Benzodiazepines, Hydrocodone    Sexual activity: Never   Social History Narrative    Live alone       MEDICATIONS & ALLERGIES:     Current Outpatient Medications on File Prior to Visit   Medication Sig    albuterol (VENTOLIN HFA) 90 mcg/actuation inhaler Inhale 2 puffs into the lungs every 4 (four) hours as needed for Wheezing or Shortness of Breath. Rescue    atorvastatin (LIPITOR) 20 MG tablet TAKE 1 TABLET(20 MG) BY MOUTH EVERY EVENING    augmented betamethasone dipropionate (DIPROLENE-AF) 0.05 % cream Apply topically 2 (two) times daily.    diclofenac sodium (VOLTAREN) 1 % Gel Apply 2 g topically 4 (four) times daily as needed (pain).    gabapentin (NEURONTIN) 300 MG capsule Take 1 capsule (300 mg total) by mouth 2 (two) times daily.    hydrocortisone (ANUSOL-HC) 2.5 % rectal cream Place rectally 2 (two) times daily.    lisinopriL-hydrochlorothiazide (PRINZIDE,ZESTORETIC) 20-12.5 mg per tablet TAKE 1 TABLET BY MOUTH EVERY DAY    sildenafiL (VIAGRA) 50 MG tablet Take 1 tablet (50 mg total) by mouth daily as needed for Erectile Dysfunction.    traZODone (DESYREL) 50 MG tablet TAKE 1 TABLET(50 MG) BY MOUTH EVERY EVENING    omeprazole (PRILOSEC OTC) 20 MG tablet Take 1 tablet (20 mg total) by mouth once daily. (Patient not taking: Reported on 8/23/2022)    omeprazole (PRILOSEC) 20 MG capsule Take 20 mg by mouth once daily.     No current facility-administered medications on file prior to visit.       Review of patient's allergies indicates:   Allergen Reactions    Levofloxacin Shortness Of Breath and Hives    Quinolones Hives and Shortness Of Breath       OBJECTIVE:   Vital Signs:  Vitals:    08/23/22 0821   BP: 102/74   Pulse: 63   SpO2: 95%   Weight: 112 kg (246 lb 14.6 oz)   Height: 5' 7" (1.702 m)       No results found for this or any previous visit (from the past 24 " hour(s)).      Physical Exam  Vitals and nursing note reviewed.   Constitutional:       General: He is not in acute distress.     Appearance: He is obese. He is not toxic-appearing or diaphoretic.   HENT:      Head: Normocephalic and atraumatic.      Right Ear: External ear normal.      Left Ear: External ear normal.   Eyes:      General: No scleral icterus.     Conjunctiva/sclera: Conjunctivae normal.      Pupils: Pupils are equal, round, and reactive to light.   Neck:      Thyroid: No thyromegaly.   Cardiovascular:      Rate and Rhythm: Normal rate and regular rhythm.      Heart sounds: Normal heart sounds. No murmur heard.  Pulmonary:      Effort: Pulmonary effort is normal. No respiratory distress.      Breath sounds: Normal breath sounds. No wheezing or rales.   Musculoskeletal:         General: No tenderness or deformity. Normal range of motion.      Cervical back: Normal range of motion and neck supple.      Right lower leg: No edema.      Left lower leg: No edema.   Lymphadenopathy:      Cervical: No cervical adenopathy.   Skin:     Comments: Mild erythema around prox aspect of right forearm around lateral epicondyle.    Neurological:      Mental Status: He is alert and oriented to person, place, and time.      Gait: Gait is intact.   Psychiatric:         Mood and Affect: Mood and affect normal.         Cognition and Memory: Memory normal.         Judgment: Judgment normal.         ASSESSMENT & PLAN:     Eczema, unspecified type  -     triamcinolone acetonide 0.1% (KENALOG) 0.1 % ointment; Apply topically 2 (two) times daily. for 14 days  Dispense: 80 g; Refill: 2  -     hydrOXYzine HCL (ATARAX) 25 MG tablet; Take 1 tablet (25 mg total) by mouth nightly as needed for Itching.  Dispense: 30 tablet; Refill: 2     Appears to have mild, uncomplicated eczema.   Tx with topical steroids, anti-itch medication PRN.       Arpan Guy MD   Internal Medicine    This note was created using Dragon voice recognition  software that occasionally misinterprets phrases or words.

## 2022-09-13 DIAGNOSIS — G47.00 INSOMNIA, UNSPECIFIED TYPE: ICD-10-CM

## 2022-09-13 RX ORDER — TRAZODONE HYDROCHLORIDE 50 MG/1
50 TABLET ORAL NIGHTLY
Qty: 90 TABLET | Refills: 1 | Status: SHIPPED | OUTPATIENT
Start: 2022-09-13 | End: 2023-04-09

## 2022-11-03 ENCOUNTER — PATIENT MESSAGE (OUTPATIENT)
Dept: ADMINISTRATIVE | Facility: HOSPITAL | Age: 47
End: 2022-11-03
Payer: COMMERCIAL

## 2022-11-14 DIAGNOSIS — E78.5 DYSLIPIDEMIA: ICD-10-CM

## 2022-11-14 RX ORDER — ATORVASTATIN CALCIUM 20 MG/1
20 TABLET, FILM COATED ORAL NIGHTLY
Qty: 90 TABLET | Refills: 0 | Status: SHIPPED | OUTPATIENT
Start: 2022-11-14 | End: 2023-08-18 | Stop reason: SDUPTHER

## 2022-11-14 NOTE — TELEPHONE ENCOUNTER
No new care gaps identified.  [facility] Embedded Care Gaps. Reference number: [id number]. 11/14/2022   1:47:52 PM CST

## 2022-12-06 ENCOUNTER — PATIENT MESSAGE (OUTPATIENT)
Dept: ADMINISTRATIVE | Facility: HOSPITAL | Age: 47
End: 2022-12-06
Payer: COMMERCIAL

## 2022-12-20 DIAGNOSIS — I10 ESSENTIAL HYPERTENSION: ICD-10-CM

## 2022-12-20 RX ORDER — LISINOPRIL AND HYDROCHLOROTHIAZIDE 12.5; 2 MG/1; MG/1
1 TABLET ORAL DAILY
Qty: 90 TABLET | Refills: 3 | Status: CANCELLED | OUTPATIENT
Start: 2022-12-20

## 2022-12-20 NOTE — TELEPHONE ENCOUNTER
Refill Routing Note   Medication(s) are not appropriate for processing by [facility] for the following reason(s):      - Required laboratory values are outdated    [facility] action(s):  Defer Medication-related problems identified: Requires labs        Medication reconciliation completed: No     Appointments  past 12m or future 3m with PCP    Date Provider   Last Visit   8/23/2022 Arpan Guy MD   Next Visit   Visit date not found Arpan Guy MD   ED visits in past 90 days: 0        Note composed:3:01 PM 12/20/2022

## 2022-12-20 NOTE — TELEPHONE ENCOUNTER
Patient's family is requesting A for this patient's care. Contacted Dr. Arnold's group to notify. Awaiting for a call back.   See [employer] message. Advised pt to take Mucinex and Tylenol. Please advise if there is something else pt can take or if anything can be sent to pharmacy.

## 2022-12-20 NOTE — TELEPHONE ENCOUNTER
No new care gaps identified.  [facility] Embedded Care Gaps. Reference number: [id number]. 12/20/2022   8:15:29 AM CST

## 2022-12-21 ENCOUNTER — PATIENT MESSAGE (OUTPATIENT)
Dept: FAMILY MEDICINE | Facility: CLINIC | Age: 47
End: 2022-12-21
Payer: COMMERCIAL

## 2022-12-21 ENCOUNTER — TELEPHONE (OUTPATIENT)
Dept: FAMILY MEDICINE | Facility: CLINIC | Age: 47
End: 2022-12-21
Payer: COMMERCIAL

## 2022-12-21 DIAGNOSIS — I10 ESSENTIAL HYPERTENSION: ICD-10-CM

## 2022-12-21 RX ORDER — LISINOPRIL AND HYDROCHLOROTHIAZIDE 12.5; 2 MG/1; MG/1
1 TABLET ORAL DAILY
Qty: 90 TABLET | Refills: 3 | OUTPATIENT
Start: 2022-12-21

## 2022-12-21 NOTE — TELEPHONE ENCOUNTER
----- Message from Tammie Arevalo sent at 12/21/2022 10:20 AM CST -----  Who Called:pt       What is the reqeust in detail:is requesting a rx refill .please advise     lisinopriL-hydrochlorothiazide (PRINZIDE,ZESTORETIC) 20-12.5 mg per tablet 90 tablet 3 11/9/2021  No  Sig: TAKE 1 TABLET BY MOUTH EVERY DAY  Sent to pharmacy as: lisinopriL-hydrochlorothiazide (PRINZIDE,ZESTORETIC) 20-12.5 mg per tablet  Class: [id number] - [address] & [address]    Can the clinic reply by [facility]?no       Best Call Back Number:[phone]      Additional Information:

## 2022-12-22 DIAGNOSIS — I10 ESSENTIAL HYPERTENSION: ICD-10-CM

## 2022-12-22 RX ORDER — LISINOPRIL AND HYDROCHLOROTHIAZIDE 12.5; 2 MG/1; MG/1
1 TABLET ORAL DAILY
Qty: 90 TABLET | Refills: 3 | Status: SHIPPED | OUTPATIENT
Start: 2022-12-22 | End: 2023-08-18 | Stop reason: SDUPTHER

## 2022-12-30 ENCOUNTER — PATIENT MESSAGE (OUTPATIENT)
Dept: ADMINISTRATIVE | Facility: HOSPITAL | Age: 47
End: 2022-12-30
Payer: COMMERCIAL

## 2023-01-10 ENCOUNTER — PATIENT OUTREACH (OUTPATIENT)
Dept: ADMINISTRATIVE | Facility: HOSPITAL | Age: 48
End: 2023-01-10
Payer: COMMERCIAL

## 2023-01-10 DIAGNOSIS — Z12.11 COLON CANCER SCREENING: Primary | ICD-10-CM

## 2023-01-12 ENCOUNTER — TELEPHONE (OUTPATIENT)
Dept: GASTROENTEROLOGY | Facility: CLINIC | Age: 48
End: 2023-01-12
Payer: COMMERCIAL

## 2023-01-12 ENCOUNTER — PATIENT MESSAGE (OUTPATIENT)
Dept: GASTROENTEROLOGY | Facility: CLINIC | Age: 48
End: 2023-01-12
Payer: COMMERCIAL

## 2023-04-11 ENCOUNTER — PATIENT MESSAGE (OUTPATIENT)
Dept: ADMINISTRATIVE | Facility: HOSPITAL | Age: 48
End: 2023-04-11
Payer: COMMERCIAL

## 2023-07-18 DIAGNOSIS — G47.00 INSOMNIA, UNSPECIFIED TYPE: ICD-10-CM

## 2023-07-18 RX ORDER — TRAZODONE HYDROCHLORIDE 50 MG/1
TABLET ORAL
Qty: 90 TABLET | Refills: 0 | Status: SHIPPED | OUTPATIENT
Start: 2023-07-18 | End: 2023-10-25

## 2023-07-18 NOTE — TELEPHONE ENCOUNTER
Provider Staff:  Action required for this patient     Please see care gap opportunities below in Care Due Message.    Thanks!  [facility]     Appointments      Date Provider   Last Visit   8/23/2022 Arpan Guy MD   Next Visit   Visit date not found Arpan Guy MD     Refill Decision Note   Jai Watkins  is requesting a refill authorization.  Brief Assessment and Rationale for Refill:  Approve     Medication Therapy Plan:  LOV:(8/23/22)      Comments:     Note composed:8:04 AM 07/18/2023

## 2023-07-18 NOTE — TELEPHONE ENCOUNTER
Care Due:                  Date            Visit Type   Department     Provider  --------------------------------------------------------------------------------                                EP -                              PRIMARY      [facility]    Arpan Dickinson  Last Visit: 08-      CARE ([facility])   MEDICINE       Brooks  Next Visit: None Scheduled  None         None Found                                                            Last  Test          Frequency    Reason                     Performed    Due Date  --------------------------------------------------------------------------------    Office Visit  12 months..  lisinopriL-hydrochlorothi  08- 08-                             azide, sildenafiL,                             traZODone................    CMP.........  12 months..  lisinopriL-hydrochlorothi  10-   10-                             azide....................    [employer] Embedded Care Due Messages. Reference number: [id number].   7/18/2023 7:19:32 AM CDT

## 2023-08-18 ENCOUNTER — OFFICE VISIT (OUTPATIENT)
Dept: FAMILY MEDICINE | Facility: CLINIC | Age: 48
End: 2023-08-18
Payer: COMMERCIAL

## 2023-08-18 ENCOUNTER — PATIENT MESSAGE (OUTPATIENT)
Dept: GASTROENTEROLOGY | Facility: CLINIC | Age: 48
End: 2023-08-18
Payer: COMMERCIAL

## 2023-08-18 VITALS
WEIGHT: 260.13 LBS | BODY MASS INDEX: 40.83 KG/M2 | OXYGEN SATURATION: 97 % | RESPIRATION RATE: 16 BRPM | DIASTOLIC BLOOD PRESSURE: 84 MMHG | HEIGHT: 67 IN | TEMPERATURE: 98 F | HEART RATE: 73 BPM | SYSTOLIC BLOOD PRESSURE: 130 MMHG

## 2023-08-18 DIAGNOSIS — Z00.00 ENCOUNTER FOR ANNUAL GENERAL MEDICAL EXAMINATION WITHOUT ABNORMAL FINDINGS IN ADULT: Primary | ICD-10-CM

## 2023-08-18 DIAGNOSIS — E78.5 DYSLIPIDEMIA: ICD-10-CM

## 2023-08-18 DIAGNOSIS — I10 ESSENTIAL HYPERTENSION: ICD-10-CM

## 2023-08-18 DIAGNOSIS — Z12.11 COLON CANCER SCREENING: ICD-10-CM

## 2023-08-18 DIAGNOSIS — R35.0 INCREASED URINARY FREQUENCY: ICD-10-CM

## 2023-08-18 PROCEDURE — 99214 OFFICE O/P EST MOD 30 MIN: CPT | Mod: S$GLB,,, | Performed by: STUDENT IN AN ORGANIZED HEALTH CARE EDUCATION/TRAINING PROGRAM

## 2023-08-18 PROCEDURE — 99999 PR PBB SHADOW E&M-EST. PATIENT-LVL V: CPT | Mod: PBBFAC,,, | Performed by: STUDENT IN AN ORGANIZED HEALTH CARE EDUCATION/TRAINING PROGRAM

## 2023-08-18 PROCEDURE — 99214 PR OFFICE/OUTPT VISIT, EST, LEVL IV, 30-39 MIN: ICD-10-PCS | Mod: S$GLB,,, | Performed by: STUDENT IN AN ORGANIZED HEALTH CARE EDUCATION/TRAINING PROGRAM

## 2023-08-18 PROCEDURE — 99999 PR PBB SHADOW E&M-EST. PATIENT-LVL V: ICD-10-PCS | Mod: PBBFAC,,, | Performed by: STUDENT IN AN ORGANIZED HEALTH CARE EDUCATION/TRAINING PROGRAM

## 2023-08-18 RX ORDER — TAMSULOSIN HYDROCHLORIDE 0.4 MG/1
0.4 CAPSULE ORAL NIGHTLY
Qty: 90 CAPSULE | Refills: 1 | Status: SHIPPED | OUTPATIENT
Start: 2023-08-18 | End: 2024-02-15

## 2023-08-18 RX ORDER — ATORVASTATIN CALCIUM 20 MG/1
20 TABLET, FILM COATED ORAL NIGHTLY
Qty: 90 TABLET | Refills: 0 | Status: SHIPPED | OUTPATIENT
Start: 2023-08-18 | End: 2023-11-21

## 2023-08-18 RX ORDER — LISINOPRIL AND HYDROCHLOROTHIAZIDE 12.5; 2 MG/1; MG/1
1 TABLET ORAL DAILY
Qty: 90 TABLET | Refills: 3 | Status: SHIPPED | OUTPATIENT
Start: 2023-08-18

## 2023-08-18 NOTE — PROGRESS NOTES
SUBJECTIVE:    CHIEF COMPLAINT:   Chief Complaint   Patient presents with    Annual Exam    Insomnia    Fatigue    Urinary Frequency           274}    HISTORY OF PRESENT ILLNESS:  Jai Watkins III is a 48 y.o. male past medical history hypertension, hyperlipidemia, anxiety, and insomnia who presents to the clinic for annual wellness visit.  He reports that over the past few months he is experienced increased urination and has stopped taking his sleeping medicine (trazodone) due to nighttime bedwetting.  He does admit to consuming plenty of fluids even before bed and often does get up in the middle of the night 3-4 times to use the restroom.  He denies any other concerns, denies dysuria or hematuria,  fevers chills, nausea, vomiting or diarrhea.  Denies chest pain or abdominal pain.      PAST MEDICAL HISTORY:     274}  Past Medical History:   Diagnosis Date    Anxiety     Depression     Eczema 10/30/2013    Hyperlipidemia LDL goal < 130     Hypertension     takes Metoprolol, also helps with palpitations    Recurrent upper respiratory infection (URI)     Sleep apnea     resolved with UPPP       PAST SURGICAL HISTORY:  Past Surgical History:   Procedure Laterality Date    ADENOIDECTOMY      ANAL FISTULOTOMY      CARPAL TUNNEL RELEASE Bilateral     ROTATOR CUFF REPAIR  2011    right    SINUS SURGERY  2011    TONSILLECTOMY      UVULOPALATOPHARYNGOPLASTY  2011       SOCIAL HISTORY:  Social History     Socioeconomic History    Marital status: Single   Occupational History    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     Employer: [employer]   Tobacco Use    Smoking status: Never     Passive exposure: Never    Smokeless tobacco: Never   Substance and Sexual Activity    Alcohol use: Yes    Drug use: Not Currently     Types: Benzodiazepines, Hydrocodone    Sexual activity: Never   Social History Narrative    Live alone     Social Determinants of Health     Stress: Stress Concern Present (12/2/2019)    [facility] - Occupational Stress Questionnaire     Feeling of Stress : Very much       FAMILY HISTORY:       Family History   Problem Relation Age of Onset    Hypertension Mother     Heart disease Mother 55        CAD, CABG    Diabetes Mother     Heart attack Mother     Arthritis Mother     Depression Mother     Hypertension Father     Arthritis Father     Kidney disease Father     Depression Father     Hearing loss Father     Other Father         elevated PSA but negative serial biopsies    Allergic rhinitis Sister     Allergies Sister     Angioedema Sister     Asthma Neg Hx     Eczema Neg Hx     Immunodeficiency Neg Hx        ALLERGIES AND MEDICATIONS: updated and reviewed.      274}  Review of patient's allergies indicates:   Allergen Reactions    Levofloxacin Shortness Of Breath and Hives    Quinolones Hives and Shortness Of Breath     Medication List with Changes/Refills   New Medications    TAMSULOSIN (FLOMAX) 0.4 MG CAP    Take 1 capsule (0.4 mg total) by mouth nightly.   Current Medications    ALBUTEROL (VENTOLIN HFA) 90 MCG/ACTUATION INHALER    Inhale 2 puffs into the lungs every 4 (four) hours as needed for Wheezing or Shortness of Breath. Rescue    AUGMENTED BETAMETHASONE DIPROPIONATE (DIPROLENE-AF) 0.05 % CREAM    Apply topically 2 (two) times daily.    DICLOFENAC SODIUM (VOLTAREN) 1 % GEL    Apply 2 g topically 4 (four) times daily as needed (pain).    GABAPENTIN (NEURONTIN) 300 MG CAPSULE    Take 1 capsule (300 mg total) by mouth 2 (two) times daily.    HYDROCORTISONE (ANUSOL-HC) 2.5 % RECTAL CREAM    Place rectally 2 (two) times daily.    HYDROXYZINE HCL (ATARAX) 25 MG TABLET    TAKE 1 TABLET(25 MG) BY MOUTH EVERY NIGHT AS NEEDED FOR ITCHING    OMEPRAZOLE (PRILOSEC OTC) 20 MG TABLET    Take 1 tablet (20 mg total) by mouth once daily.    SILDENAFIL (VIAGRA) 50 MG TABLET    Take 1 tablet (50 mg total) by mouth daily as needed for Erectile Dysfunction.    TRAZODONE (DESYREL) 50 MG TABLET    TAKE  "1 TABLET(50 MG) BY MOUTH EVERY EVENING    TRIAMCINOLONE ACETONIDE 0.1% (KENALOG) 0.1 % OINTMENT    Apply topically 2 (two) times daily. for 14 days   Changed and/or Refilled Medications    Modified Medication Previous Medication    ATORVASTATIN (LIPITOR) 20 MG TABLET atorvastatin (LIPITOR) 20 MG tablet       Take 1 tablet (20 mg total) by mouth every evening.    Take 1 tablet (20 mg total) by mouth every evening.    LISINOPRIL-HYDROCHLOROTHIAZIDE (PRINZIDE,ZESTORETIC) 20-12.5 MG PER TABLET lisinopriL-hydrochlorothiazide (PRINZIDE,ZESTORETIC) 20-12.5 mg per tablet       Take 1 tablet by mouth once daily.    Take 1 tablet by mouth once daily.   Discontinued Medications    OMEPRAZOLE (PRILOSEC) 20 MG CAPSULE    Take 20 mg by mouth once daily.       SCREENING HISTORY:    274}  Health Maintenance         Date Due Completion Date    Colorectal Cancer Screening Never done ---    COVID-19 Vaccine (3 - Pfizer series) 07/28/2021 6/2/2021    Shingles Vaccine (1 of 2) Never done ---    Influenza Vaccine (1) 09/01/2023 ---    Hemoglobin A1c (Diabetic Prevention Screening) 10/15/2024 10/15/2021    TETANUS VACCINE 04/08/2026 4/8/2016 (Done)    Override on 4/8/2016: Done    Lipid Panel 05/08/2026 5/8/2021            REVIEW OF SYSTEMS:   Review of Systems   All other systems reviewed and are negative.      PHYSICAL EXAM:      274}  /84 (BP Location: Right arm, Patient Position: Sitting, BP Method: Large (Manual))   Pulse 73   Temp 98.1 °F (36.7 °C) (Oral)   Resp 16   Ht 5' 7" (1.702 m)   Wt 118 kg (260 lb 2.3 oz)   SpO2 97%   BMI 40.74 kg/m²   Wt Readings from Last 3 Encounters:   08/18/23 118 kg (260 lb 2.3 oz)   08/23/22 112 kg (246 lb 14.6 oz)   08/08/22 112.1 kg (247 lb 2.2 oz)     BP Readings from Last 3 Encounters:   08/18/23 130/84   08/23/22 102/74   08/08/22 102/60     Estimated body mass index is 40.74 kg/m² as calculated from the following:    Height as of this encounter: 5' 7" (1.702 m).    Weight as of this " encounter: 118 kg (260 lb 2.3 oz).     Physical Exam  Vitals reviewed.   Constitutional:       General: He is not in acute distress.     Appearance: Normal appearance. He is well-developed and normal weight. He is not ill-appearing.   HENT:      Head: Normocephalic and atraumatic.      Right Ear: External ear normal.      Left Ear: External ear normal.      Nose: Nose normal.   Eyes:      Extraocular Movements: Extraocular movements intact.      Conjunctiva/sclera: Conjunctivae normal.      Pupils: Pupils are equal, round, and reactive to light.   Neck:      Thyroid: No thyroid mass.   Cardiovascular:      Rate and Rhythm: Normal rate and regular rhythm.      Pulses: Normal pulses.      Heart sounds: Normal heart sounds, S1 normal and S2 normal. No murmur heard.     No gallop.   Pulmonary:      Effort: Pulmonary effort is normal. No respiratory distress.      Breath sounds: Normal breath sounds and air entry. No stridor. No wheezing, rhonchi or rales.   Abdominal:      General: Bowel sounds are normal. There is no distension.      Palpations: Abdomen is soft. There is no mass.      Tenderness: There is no abdominal tenderness.   Musculoskeletal:         General: No swelling or deformity. Normal range of motion.      Cervical back: Normal range of motion and neck supple. No edema or erythema.   Skin:     General: Skin is warm and dry.      Findings: No lesion or rash.   Neurological:      General: No focal deficit present.      Mental Status: He is alert and oriented to person, place, and time. Mental status is at baseline.   Psychiatric:         Mood and Affect: Mood normal.         Behavior: Behavior normal. Behavior is cooperative.         Judgment: Judgment normal.         LABS:   274}  I have reviewed old labs below:  Lab Results   Component Value Date    WBC 6.36 10/15/2021    HGB 15.1 10/15/2021    HCT 44.9 10/15/2021    MCV 91 10/15/2021     10/15/2021     10/15/2021    K 4.0 10/15/2021      10/15/2021    CALCIUM 10.2 10/15/2021    CO2 21 (L) 10/15/2021    GLU 97 10/15/2021    BUN 16 10/15/2021    CREATININE 1.0 10/15/2021    ANIONGAP 12 10/15/2021    ESTGFRAFRICA >60.0 10/15/2021    EGFRNONAA >60.0 10/15/2021    PROT 7.6 10/15/2021    ALBUMIN 4.7 02/18/2022    BILITOT 0.8 10/15/2021    ALKPHOS 81 10/15/2021    ALT 54 (H) 10/15/2021    AST 30 10/15/2021    CHOL 143 05/08/2021    TRIG 165 (H) 05/08/2021    HDL 35 (L) 05/08/2021    LDLCALC 79 05/08/2021    TSH 1.059 10/15/2021    PSA 0.43 12/23/2009    HGBA1C 5.5 10/15/2021       ASSESSMENT AND PLAN:  274}  1. Encounter for annual general medical examination without abnormal findings in adult  -     CBC Auto Differential; Future; Expected date: 08/18/2023  -     Comprehensive Metabolic Panel; Future; Expected date: 08/18/2023  -     Hemoglobin A1C; Future; Expected date: 08/18/2023  -     TSH; Future; Expected date: 08/18/2023  -     Lipid Panel; Future; Expected date: 08/18/2023  -     PSA, Screening; Future; Expected date: 08/18/2023  -     TESTOSTERONE; Future; Expected date: 08/18/2023  -     LIPID PANEL; Future; Expected date: 08/18/2023  -     FERRITIN; Future; Expected date: 08/18/2023  -     Cancel: Urinalysis, Reflex to Urine Culture Urine, Clean Catch    2. Dyslipidemia  -     atorvastatin (LIPITOR) 20 MG tablet; Take 1 tablet (20 mg total) by mouth every evening.  Dispense: 90 tablet; Refill: 0    3. Essential hypertension  -     lisinopriL-hydrochlorothiazide (PRINZIDE,ZESTORETIC) 20-12.5 mg per tablet; Take 1 tablet by mouth once daily.  Dispense: 90 tablet; Refill: 3    4. Increased urinary frequency  -     tamsulosin (FLOMAX) 0.4 mg Cap; Take 1 capsule (0.4 mg total) by mouth nightly.  Dispense: 90 capsule; Refill: 1  -     Urinalysis, Reflex to Urine Culture Urine, Clean Catch; Future; Expected date: 08/18/2023    5. Colon cancer screening  -     Case Request Endoscopy: COLONOSCOPY           Orders Placed This Encounter   Procedures    CBC  Auto Differential    Comprehensive Metabolic Panel    Hemoglobin A1C    TSH    Lipid Panel    PSA, Screening    TESTOSTERONE    LIPID PANEL    FERRITIN    Urinalysis, Reflex to Urine Culture Urine, Clean Catch       No follow-ups on file. or sooner as needed.     no

## 2023-08-18 NOTE — PATIENT INSTRUCTIONS
Corbin Vergara,     If you are due for any health screening(s) below please notify me so we can arrange them to be ordered and scheduled to maintain your health. Most healthy patients complete it. Don't lose out on improving your health.     Tests to Keep You Healthy    Colon Cancer Screening: ORDERED BUT NOT SCHEDULED  Last Blood Pressure <= 139/89 (8/18/2023): Yes         Colon Cancer Screening    Of cancers affecting both men and women, colorectal cancer is the third leading cancer killer in the [place]. But it doesnt have to be. Screening can prevent colorectal cancer or find it at an early stage when treatment often leads to a cure.    A colonoscopy is the preferred test for detecting colon cancer. It is needed only once every 10 years if results are negative. While sedated, a flexible, lighted tube with a tiny camera is inserted into the rectum and advanced through the colon to look for cancers. An alternative screening test that is used at home and returned to the lab may also be used. It detects hidden blood in bowel movements which could indicate cancer in the colon. If results are positive, you will need a colonoscopy to determine if the blood is a sign of cancer. This type of follow up (diagnostic) colonoscopy usually requires additional copays as required by your insurance provider. Please contact your PCP if you have any questions.

## 2023-08-19 ENCOUNTER — LAB VISIT (OUTPATIENT)
Dept: LAB | Facility: HOSPITAL | Age: 48
End: 2023-08-19
Attending: STUDENT IN AN ORGANIZED HEALTH CARE EDUCATION/TRAINING PROGRAM
Payer: COMMERCIAL

## 2023-08-19 DIAGNOSIS — Z00.00 ENCOUNTER FOR ANNUAL GENERAL MEDICAL EXAMINATION WITHOUT ABNORMAL FINDINGS IN ADULT: ICD-10-CM

## 2023-08-19 LAB
ALBUMIN SERPL BCP-MCNC: 4 G/DL (ref 3.5–5.2)
ALP SERPL-CCNC: 66 U/L (ref 55–135)
ALT SERPL W/O P-5'-P-CCNC: 42 U/L (ref 10–44)
ANION GAP SERPL CALC-SCNC: 11 MMOL/L (ref 8–16)
AST SERPL-CCNC: 31 U/L (ref 10–40)
BASOPHILS # BLD AUTO: 0.03 K/UL (ref 0–0.2)
BASOPHILS NFR BLD: 0.5 % (ref 0–1.9)
BILIRUB SERPL-MCNC: 0.2 MG/DL (ref 0.1–1)
BUN SERPL-MCNC: 16 MG/DL (ref 6–20)
CALCIUM SERPL-MCNC: 9.3 MG/DL (ref 8.7–10.5)
CHLORIDE SERPL-SCNC: 106 MMOL/L (ref 95–110)
CHOLEST SERPL-MCNC: 206 MG/DL (ref 120–199)
CHOLEST SERPL-MCNC: 206 MG/DL (ref 120–199)
CHOLEST/HDLC SERPL: 4.8 {RATIO} (ref 2–5)
CHOLEST/HDLC SERPL: 4.8 {RATIO} (ref 2–5)
CO2 SERPL-SCNC: 23 MMOL/L (ref 23–29)
COMPLEXED PSA SERPL-MCNC: 0.47 NG/ML (ref 0–4)
CREAT SERPL-MCNC: 1 MG/DL (ref 0.5–1.4)
DIFFERENTIAL METHOD: ABNORMAL
EOSINOPHIL # BLD AUTO: 0.1 K/UL (ref 0–0.5)
EOSINOPHIL NFR BLD: 2.1 % (ref 0–8)
ERYTHROCYTE [DISTWIDTH] IN BLOOD BY AUTOMATED COUNT: 12.3 % (ref 11.5–14.5)
EST. GFR  (NO RACE VARIABLE): >60 ML/MIN/1.73 M^2
ESTIMATED AVG GLUCOSE: 131 MG/DL (ref 68–131)
FERRITIN SERPL-MCNC: 274 NG/ML (ref 20–300)
GLUCOSE SERPL-MCNC: 92 MG/DL (ref 70–110)
HBA1C MFR BLD: 6.2 % (ref 4–5.6)
HCT VFR BLD AUTO: 44.2 % (ref 40–54)
HDLC SERPL-MCNC: 43 MG/DL (ref 40–75)
HDLC SERPL-MCNC: 43 MG/DL (ref 40–75)
HDLC SERPL: 20.9 % (ref 20–50)
HDLC SERPL: 20.9 % (ref 20–50)
HGB BLD-MCNC: 14.3 G/DL (ref 14–18)
IMM GRANULOCYTES # BLD AUTO: 0.04 K/UL (ref 0–0.04)
IMM GRANULOCYTES NFR BLD AUTO: 0.7 % (ref 0–0.5)
LDLC SERPL CALC-MCNC: 140.4 MG/DL (ref 63–159)
LDLC SERPL CALC-MCNC: 140.4 MG/DL (ref 63–159)
LYMPHOCYTES # BLD AUTO: 1.7 K/UL (ref 1–4.8)
LYMPHOCYTES NFR BLD: 28.6 % (ref 18–48)
MCH RBC QN AUTO: 30.2 PG (ref 27–31)
MCHC RBC AUTO-ENTMCNC: 32.4 G/DL (ref 32–36)
MCV RBC AUTO: 93 FL (ref 82–98)
MONOCYTES # BLD AUTO: 0.6 K/UL (ref 0.3–1)
MONOCYTES NFR BLD: 9.6 % (ref 4–15)
NEUTROPHILS # BLD AUTO: 3.4 K/UL (ref 1.8–7.7)
NEUTROPHILS NFR BLD: 58.5 % (ref 38–73)
NONHDLC SERPL-MCNC: 163 MG/DL
NONHDLC SERPL-MCNC: 163 MG/DL
NRBC BLD-RTO: 0 /100 WBC
PLATELET # BLD AUTO: 285 K/UL (ref 150–450)
PMV BLD AUTO: 10.3 FL (ref 9.2–12.9)
POTASSIUM SERPL-SCNC: 4.4 MMOL/L (ref 3.5–5.1)
PROT SERPL-MCNC: 7.1 G/DL (ref 6–8.4)
RBC # BLD AUTO: 4.73 M/UL (ref 4.6–6.2)
SODIUM SERPL-SCNC: 140 MMOL/L (ref 136–145)
TESTOST SERPL-MCNC: 440 NG/DL (ref 304–1227)
TRIGL SERPL-MCNC: 113 MG/DL (ref 30–150)
TRIGL SERPL-MCNC: 113 MG/DL (ref 30–150)
TSH SERPL DL<=0.005 MIU/L-ACNC: 1.55 UIU/ML (ref 0.4–4)
WBC # BLD AUTO: 5.84 K/UL (ref 3.9–12.7)

## 2023-08-19 PROCEDURE — 83036 HEMOGLOBIN GLYCOSYLATED A1C: CPT | Performed by: STUDENT IN AN ORGANIZED HEALTH CARE EDUCATION/TRAINING PROGRAM

## 2023-08-19 PROCEDURE — 85025 COMPLETE CBC W/AUTO DIFF WBC: CPT | Performed by: STUDENT IN AN ORGANIZED HEALTH CARE EDUCATION/TRAINING PROGRAM

## 2023-08-19 PROCEDURE — 82728 ASSAY OF FERRITIN: CPT | Performed by: STUDENT IN AN ORGANIZED HEALTH CARE EDUCATION/TRAINING PROGRAM

## 2023-08-19 PROCEDURE — 84403 ASSAY OF TOTAL TESTOSTERONE: CPT | Performed by: STUDENT IN AN ORGANIZED HEALTH CARE EDUCATION/TRAINING PROGRAM

## 2023-08-19 PROCEDURE — 84153 ASSAY OF PSA TOTAL: CPT | Performed by: STUDENT IN AN ORGANIZED HEALTH CARE EDUCATION/TRAINING PROGRAM

## 2023-08-19 PROCEDURE — 80053 COMPREHEN METABOLIC PANEL: CPT | Performed by: STUDENT IN AN ORGANIZED HEALTH CARE EDUCATION/TRAINING PROGRAM

## 2023-08-19 PROCEDURE — 84443 ASSAY THYROID STIM HORMONE: CPT | Performed by: STUDENT IN AN ORGANIZED HEALTH CARE EDUCATION/TRAINING PROGRAM

## 2023-08-19 PROCEDURE — 36415 COLL VENOUS BLD VENIPUNCTURE: CPT | Mod: PO | Performed by: STUDENT IN AN ORGANIZED HEALTH CARE EDUCATION/TRAINING PROGRAM

## 2023-08-19 PROCEDURE — 80061 LIPID PANEL: CPT | Performed by: STUDENT IN AN ORGANIZED HEALTH CARE EDUCATION/TRAINING PROGRAM

## 2023-08-21 NOTE — PROGRESS NOTES
Spoke with patient regarding his results, he expressed understanding and will call with any questions or concerns.

## 2023-10-11 ENCOUNTER — PATIENT MESSAGE (OUTPATIENT)
Dept: GASTROENTEROLOGY | Facility: CLINIC | Age: 48
End: 2023-10-11
Payer: COMMERCIAL

## 2023-10-25 DIAGNOSIS — G47.00 INSOMNIA, UNSPECIFIED TYPE: ICD-10-CM

## 2023-10-25 RX ORDER — TRAZODONE HYDROCHLORIDE 50 MG/1
TABLET ORAL
Qty: 90 TABLET | Refills: 0 | Status: SHIPPED | OUTPATIENT
Start: 2023-10-25

## 2023-10-25 NOTE — TELEPHONE ENCOUNTER
Refill Routing Note   Medication(s) are not appropriate for processing by [facility] for the following reason(s):      New or recently adjusted medication    [facility] action(s):  Defer Care Due:  Labs due            Appointments  past 12m or future 3m with PCP    Date Provider   Last Visit   8/23/2022 Arpan Guy MD   Next Visit   Visit date not found Arpan Guy MD   ED visits in past 90 days: 0        Note composed:11:49 AM 10/25/2023

## 2023-10-25 NOTE — TELEPHONE ENCOUNTER
Care Due:                  Date            Visit Type   Department     Provider  --------------------------------------------------------------------------------                                EP -                              PRIMARY      [facility]    Arpan Dickinson  Last Visit: 08-      CARE ([facility])   MEDICINE       Naccari                              EP -                              PRIMARY      [facility]    Arpan Ayalaon  Next Visit: 08-      CARE ([facility])   MEDICINE       Naccari                                                            Last  Test          Frequency    Reason                     Performed    Due Date  --------------------------------------------------------------------------------    Office Visit  15 months..  sildenafiL, traZODone....  08- 11-    [facility] Embedded Care Due Messages. Reference number: [id number].   10/25/2023 10:35:41 AM CDT

## 2023-11-16 DIAGNOSIS — E78.5 DYSLIPIDEMIA: ICD-10-CM

## 2023-11-16 NOTE — TELEPHONE ENCOUNTER
Refill Routing Note   Medication(s) are not appropriate for processing by [facility] for the following reason(s):        Non-participating provider:     [facility] action(s):  Route      Medication Therapy Plan:         Appointments  past 12m or future 3m with PCP    Date Provider   Last Visit   8/18/2023 Carl Serrano, DO   Next Visit   Visit date not found Carl Serrano, DO   ED visits in past 90 days: 0        Note composed:12:57 PM 11/16/2023

## 2023-11-21 RX ORDER — ATORVASTATIN CALCIUM 20 MG/1
20 TABLET, FILM COATED ORAL NIGHTLY
Qty: 90 TABLET | Refills: 4 | Status: SHIPPED | OUTPATIENT
Start: 2023-11-21

## 2024-01-05 ENCOUNTER — PATIENT MESSAGE (OUTPATIENT)
Dept: ADMINISTRATIVE | Facility: HOSPITAL | Age: 49
End: 2024-01-05
Payer: COMMERCIAL

## 2024-02-13 DIAGNOSIS — R35.0 INCREASED URINARY FREQUENCY: ICD-10-CM

## 2024-02-15 RX ORDER — TAMSULOSIN HYDROCHLORIDE 0.4 MG/1
CAPSULE ORAL
Qty: 90 CAPSULE | Refills: 4 | Status: SHIPPED | OUTPATIENT
Start: 2024-02-15

## 2024-02-15 NOTE — TELEPHONE ENCOUNTER
Refill Routing Note   Medication(s) are not appropriate for processing by [facility] for the following reason(s):        Patient not seen by provider within 15 months    [facility] action(s):  Defer               Appointments  past 12m or future 3m with PCP    Date Provider   Last Visit   8/23/2022 Arpan Guy MD   Next Visit   Visit date not found Arpan Guy MD   ED visits in past 90 days: 0        Note composed:7:55 PM 02/14/2024

## 2024-08-19 ENCOUNTER — HOSPITAL ENCOUNTER (OUTPATIENT)
Dept: RADIOLOGY | Facility: CLINIC | Age: 49
Discharge: HOME | End: 2024-08-19
Attending: STUDENT IN AN ORGANIZED HEALTH CARE EDUCATION/TRAINING PROGRAM
Payer: COMMERCIAL

## 2024-08-19 ENCOUNTER — OFFICE VISIT (OUTPATIENT)
Dept: FAMILY MEDICINE | Facility: CLINIC | Age: 49
End: 2024-08-19
Payer: COMMERCIAL

## 2024-08-19 VITALS
WEIGHT: 266.13 LBS | TEMPERATURE: 98 F | DIASTOLIC BLOOD PRESSURE: 68 MMHG | HEIGHT: 67 IN | OXYGEN SATURATION: 97 % | BODY MASS INDEX: 41.77 KG/M2 | HEART RATE: 77 BPM | SYSTOLIC BLOOD PRESSURE: 110 MMHG | RESPIRATION RATE: 16 BRPM

## 2024-08-19 DIAGNOSIS — R05.2 SUBACUTE COUGH: ICD-10-CM

## 2024-08-19 DIAGNOSIS — Z12.11 ENCOUNTER FOR SCREENING FOR MALIGNANT NEOPLASM OF COLON: ICD-10-CM

## 2024-08-19 DIAGNOSIS — Z00.00 ROUTINE GENERAL MEDICAL EXAMINATION AT A HEALTH CARE FACILITY: Primary | ICD-10-CM

## 2024-08-19 DIAGNOSIS — N52.9 ERECTILE DYSFUNCTION, UNSPECIFIED ERECTILE DYSFUNCTION TYPE: ICD-10-CM

## 2024-08-19 DIAGNOSIS — R53.83 FATIGUE, UNSPECIFIED TYPE: ICD-10-CM

## 2024-08-19 DIAGNOSIS — G47.33 OSA (OBSTRUCTIVE SLEEP APNEA): ICD-10-CM

## 2024-08-19 PROCEDURE — 99999 PR PBB SHADOW E&M-EST. PATIENT-LVL V: CPT | Mod: PBBFAC,,, | Performed by: STUDENT IN AN ORGANIZED HEALTH CARE EDUCATION/TRAINING PROGRAM

## 2024-08-19 PROCEDURE — 99396 PREV VISIT EST AGE 40-64: CPT | Mod: S$GLB,,, | Performed by: STUDENT IN AN ORGANIZED HEALTH CARE EDUCATION/TRAINING PROGRAM

## 2024-08-19 PROCEDURE — 71046 X-RAY EXAM CHEST 2 VIEWS: CPT | Mod: TC,FY,PO

## 2024-08-19 PROCEDURE — 71046 X-RAY EXAM CHEST 2 VIEWS: CPT | Mod: 26,,, | Performed by: RADIOLOGY

## 2024-08-19 NOTE — PROGRESS NOTES
"[facility] MEDICINE CLINIC NOTE    Patient Name: Jai Watkins III  YOB: 1975    PRESENTING HISTORY     History of Present Illness:  Mr. Jai Watkins III is a 49 y.o. male     Cough/chest congestion for about 6 weeks.   Ema fevers.   Taking mucinex/sudafed at home.   Occ chest pain from coughing, central.   Some SOB.       Feels worn down/tired.   No motivation to exercise.   Issues with ED.       Has been donating plasma twice weekly.     ROS      OBJECTIVE:   Vital Signs:  Vitals:    08/19/24 0758   BP: 110/68   Pulse: 77   Resp: 16   Temp: 98.2 °F (36.8 °C)   TempSrc: Oral   SpO2: 97%   Weight: 120.7 kg (266 lb 1.5 oz)   Height: 5' 7" (1.702 m)          Physical Exam: Normal, no change.     Physical Exam    ASSESSMENT & PLAN:     There are no diagnoses linked to this encounter.         Arpan Guy  Internal Medicine            "

## 2024-08-19 NOTE — PROGRESS NOTES
"[facility] CLINIC NOTE    Patient Name: Jai Watkins III  YOB: 1975    PRESENTING HISTORY     History of Present Illness:  Mr. Jai Watkins III is a 49 y.o. male here for annual.     HTN- well controlled.     HLD- on statin.     Has gained a little weight since last year. Currently at highest weight 266, BMI 41.7.   On road more, eating at gas statsions/fast food several times weekly.   Feels fatigued, no energy to exercsie.     Needs PSA testing. No prior colon testing.   [employer] was given to patient on 8/19/2024 8:20 AM     LINDA- can't tolerate CPAP. S/o Uppp. Inspire was discussed with him.       Cough/chest congestion for about 6 weeks.   No fevers.   Taking mucinex/sudafed at home.   Occ chest pain from coughing, central.   Some SOB.       Feels worn down/tired.   No motivation to exercise.   Issues with ED.       Has been donating plasma twice weekly.     ROS      OBJECTIVE:   Vital Signs:  Vitals:    08/19/24 0758   BP: 110/68   Pulse: 77   Resp: 16   Temp: 98.2 °F (36.8 °C)   TempSrc: Oral   SpO2: 97%   Weight: 120.7 kg (266 lb 1.5 oz)   Height: 5' 7" (1.702 m)            Physical Exam  Vitals and nursing note reviewed.   Constitutional:       Appearance: He is obese. He is not diaphoretic.   HENT:      Head: Normocephalic and atraumatic.      Right Ear: External ear normal.      Left Ear: External ear normal.   Eyes:      General: No scleral icterus.     Conjunctiva/sclera: Conjunctivae normal.      Pupils: Pupils are equal, round, and reactive to light.   Neck:      Thyroid: No thyromegaly.   Cardiovascular:      Rate and Rhythm: Normal rate and regular rhythm.      Heart sounds: Normal heart sounds. No murmur heard.  Pulmonary:      Effort: Pulmonary effort is normal. No respiratory distress.      Breath sounds: Normal breath sounds. No wheezing or rales.   Musculoskeletal:         General: No tenderness or deformity. Normal range of motion.      Cervical back: Normal " range of motion and neck supple.      Right lower leg: No edema.      Left lower leg: No edema.   Lymphadenopathy:      Cervical: No cervical adenopathy.   Skin:     General: Skin is warm and dry.      Findings: No erythema or rash.   Neurological:      Mental Status: He is alert and oriented to person, place, and time.      Gait: Gait is intact.   Psychiatric:         Mood and Affect: Mood and affect normal.         Cognition and Memory: Memory normal.         Judgment: Judgment normal.         ASSESSMENT & PLAN:     Routine general medical examination at a health care facility  -     Lipid Panel; Future; Expected date: 08/19/2024  -     Hemoglobin A1C; Future; Expected date: 08/19/2024  -     Comprehensive Metabolic Panel; Future; Expected date: 08/19/2024  -     PSA, Screening; Future; Expected date: 08/19/2024  -     TSH; Future; Expected date: 08/19/2024    Subacute cough  -     X-Ray Chest PA And Lateral; Future; Expected date: 08/19/2024  -     CBC W/ AUTO DIFFERENTIAL; Future; Expected date: 08/19/2024  -     Procalcitonin; Future; Expected date: 08/19/2024    Encounter for screening for malignant neoplasm of colon  -     Fecal Immunochemical Test (iFOBT); Future; Expected date: 08/19/2024    LINDA (obstructive sleep apnea)  -     TSH; Future; Expected date: 08/19/2024    Erectile dysfunction, unspecified erectile dysfunction type  -     TESTOSTERONE; Future; Expected date: 08/19/2024  -     TSH; Future; Expected date: 08/19/2024    Fatigue, unspecified type  -     TSH; Future; Expected date: 08/19/2024      49 M with CLass 3 obesity, HTN, HLD, LINDA c/o fatigue, sexual dysfunction.    We need to work on this. I will see him back shortly to address.     Investigations above.     Arpan Guy  Internal Medicine

## 2024-08-22 DIAGNOSIS — I10 ESSENTIAL HYPERTENSION: ICD-10-CM

## 2024-08-22 RX ORDER — LISINOPRIL AND HYDROCHLOROTHIAZIDE 12.5; 2 MG/1; MG/1
1 TABLET ORAL
Qty: 90 TABLET | Refills: 3 | Status: SHIPPED | OUTPATIENT
Start: 2024-08-22

## 2024-08-22 NOTE — TELEPHONE ENCOUNTER
No care due was identified.  [facility] Embedded Care Due Messages. Reference number: [id number].   8/22/2024 4:07:01 AM CDT

## 2024-08-22 NOTE — TELEPHONE ENCOUNTER
Refill Decision Note   Jai Watkins  is requesting a refill authorization.  Brief Assessment and Rationale for Refill:  Approve     Medication Therapy Plan:         Comments:     Note composed:1:38 PM 08/22/2024

## 2024-09-12 ENCOUNTER — PATIENT MESSAGE (OUTPATIENT)
Dept: FAMILY MEDICINE | Facility: CLINIC | Age: 49
End: 2024-09-12
Payer: COMMERCIAL

## 2024-09-17 ENCOUNTER — LAB VISIT (OUTPATIENT)
Dept: LAB | Facility: HOSPITAL | Age: 49
End: 2024-09-17
Attending: STUDENT IN AN ORGANIZED HEALTH CARE EDUCATION/TRAINING PROGRAM
Payer: COMMERCIAL

## 2024-09-17 DIAGNOSIS — Z12.11 ENCOUNTER FOR SCREENING FOR MALIGNANT NEOPLASM OF COLON: ICD-10-CM

## 2024-09-17 LAB — HEMOCCULT STL QL IA: NEGATIVE

## 2024-09-17 PROCEDURE — 82274 ASSAY TEST FOR BLOOD FECAL: CPT | Performed by: STUDENT IN AN ORGANIZED HEALTH CARE EDUCATION/TRAINING PROGRAM

## 2024-09-19 ENCOUNTER — OFFICE VISIT (OUTPATIENT)
Dept: FAMILY MEDICINE | Facility: CLINIC | Age: 49
End: 2024-09-19
Payer: COMMERCIAL

## 2024-09-19 VITALS
BODY MASS INDEX: 41.66 KG/M2 | HEIGHT: 67 IN | DIASTOLIC BLOOD PRESSURE: 80 MMHG | WEIGHT: 265.44 LBS | OXYGEN SATURATION: 98 % | SYSTOLIC BLOOD PRESSURE: 108 MMHG | HEART RATE: 73 BPM

## 2024-09-19 DIAGNOSIS — R05.3 CHRONIC COUGH: Primary | ICD-10-CM

## 2024-09-19 DIAGNOSIS — E66.01 CLASS 3 SEVERE OBESITY WITH SERIOUS COMORBIDITY AND BODY MASS INDEX (BMI) OF 40.0 TO 44.9 IN ADULT, UNSPECIFIED OBESITY TYPE: ICD-10-CM

## 2024-09-19 DIAGNOSIS — I10 ESSENTIAL HYPERTENSION: ICD-10-CM

## 2024-09-19 DIAGNOSIS — R73.03 PRE-DIABETES: ICD-10-CM

## 2024-09-19 DIAGNOSIS — E78.5 DYSLIPIDEMIA: ICD-10-CM

## 2024-09-19 PROCEDURE — 99214 OFFICE O/P EST MOD 30 MIN: CPT | Mod: S$GLB,,, | Performed by: STUDENT IN AN ORGANIZED HEALTH CARE EDUCATION/TRAINING PROGRAM

## 2024-09-19 PROCEDURE — G2211 COMPLEX E/M VISIT ADD ON: HCPCS | Mod: S$GLB,,, | Performed by: STUDENT IN AN ORGANIZED HEALTH CARE EDUCATION/TRAINING PROGRAM

## 2024-09-19 PROCEDURE — 99999 PR PBB SHADOW E&M-EST. PATIENT-LVL V: CPT | Mod: PBBFAC,,, | Performed by: STUDENT IN AN ORGANIZED HEALTH CARE EDUCATION/TRAINING PROGRAM

## 2024-09-19 RX ORDER — SEMAGLUTIDE 0.25 MG/.5ML
0.25 INJECTION, SOLUTION SUBCUTANEOUS
Qty: 2 ML | Refills: 0 | Status: SHIPPED | OUTPATIENT
Start: 2024-09-19 | End: 2024-10-19

## 2024-09-19 RX ORDER — PANTOPRAZOLE SODIUM 40 MG/1
40 TABLET, DELAYED RELEASE ORAL DAILY
Qty: 30 TABLET | Refills: 0 | Status: SHIPPED | OUTPATIENT
Start: 2024-09-19 | End: 2025-09-19

## 2024-09-19 NOTE — PROGRESS NOTES
"[facility] CLINIC NOTE    Patient Name: Jai Watkins III  YOB: 1975    PRESENTING HISTORY     History of Present Illness:  Mr. Jai Watkins III is a 49 y.o. male here for short f/u.     Monthslong cough. With congestion in central chest.   No fever.   No chest pain.   Productive of chunks of phlegm- white then yellow. Took 14 days of amoxil and became white again.   No hx asthma.    Uses albuterol which was given to him for LINDA. Doesn't help cough.   Started after covid.   Pets" dog, emus (outside), pig, tortoises, snakes, box turtles  Travel- covers area from [place], to [place] to [place].   Gets a lot of reflux. Takes PPI for a week intermittently. Sometimes chokes in the night.       Class 3 obesity  Did IF last year. Lost 50lbs.   Comorbid HTN, HLD, pre-diabetes, LINDA.       Fatigued. Possibly LINDA related.   No etiology found on testing.       ROS      OBJECTIVE:   Vital Signs:  Vitals:    09/19/24 1020   BP: 108/80   Pulse: 73   SpO2: 98%   Weight: 120.4 kg (265 lb 6.9 oz)   Height: 5' 7" (1.702 m)          Physical Exam: Normal, no change.     Physical Exam    ASSESSMENT & PLAN:     Chronic cough  -     CT Chest Without Contrast; Future; Expected date: 09/19/2024  -     pantoprazole (PROTONIX) 40 MG tablet; Take 1 tablet (40 mg total) by mouth once daily.  Dispense: 30 tablet; Refill: 0    Class 3 severe obesity with serious comorbidity and body mass index (BMI) of 40.0 to 44.9 in adult, unspecified obesity type  -     semaglutide, weight loss, (WEGOVY) 0.25 mg/0.5 mL PnIj; Inject 0.25 mg into the skin every 7 days.  Dispense: 2 mL; Refill: 0    Essential hypertension  Stable, continue current medications    Dyslipidemia  Stable, continue current medications    Pre-diabetes  Could try metformin, we discussed.       Will try to get him GLP1RA and drop abdominal fat which will hopefully help metabolic complications, LINDA, energy levels.        Chronic cough most likely " fibrosis vs silent reflux. Try PPI. If fails, CT chest.     Arpan Guy  Internal Medicine    Visit complexity inherent to evaluation and management associated with medical care services that serve as the continuing focal point for all needed health care services and/or with medical care services that are part of ongoing care related to a patient's single, serious condition or a complex condition provided today

## 2024-09-19 NOTE — PATIENT INSTRUCTIONS
One month of protonix - let's see if it helps your cough.     If that doesn't help, go get the CT scan.         Approved weight loss medications:  Wegovy (in family of ozempdillan mounjaro)- This is a diabetes medicine which has been shown to help people lose weight regardless of whether you have diabetes. If you have ever had pancreatitis or a history of cancers in the family we should discuss in more details, but this medicine is generally very safe.     Contrave- This is a medication which has an antidepressant mixed with an anti-opiate medicine. It has been shown to provide modest weight loss benefits. If you have ever had seizures or are on any opiate pain medicines it is not recommended but otherwise there are no significant side effects.     Qsymia- This is another combination medicine which has a mild stimulant mixed with an old anti-seizure medicine. I usually don't recommend this one for people who have high blood pressure.     Orlistat- This medicine works by preventing the breakdown of fats in your gut. It can cause some diarrhea, abdominal cramping and bloating but is fairly safe. None of my patients have had positive experiences with this.     Plenity- Capsule you swallow which expands in the gut. Newer agent. I have some concerns about bowel health with this but as far as I know these have not been seen in the clinical trials.     There are a few more creative options we can try, but these are a good start. Let me know if you are interested in any of these.

## 2024-09-27 ENCOUNTER — TELEPHONE (OUTPATIENT)
Dept: FAMILY MEDICINE | Facility: CLINIC | Age: 49
End: 2024-09-27
Payer: COMMERCIAL

## 2024-09-27 NOTE — TELEPHONE ENCOUNTER
----- Message from Bridgette Bull sent at 9/27/2024  8:04 AM CDT -----  Contact: [facility]  Type:  Pharmacy Calling to Clarify an RX    Pharmacy Name:[facility]    Prescription Name: semaglutide, weight loss, (WEGOVY) 0.25 mg/0.5 mL PnIj    What do they need to clarify?:fax failed to go through    Best Call Back Number: Fax# [phone]    Additional Information: please resend    If it still does not go through, their phone is     Please advise    Thanks

## 2024-10-15 DIAGNOSIS — R05.3 CHRONIC COUGH: ICD-10-CM

## 2024-10-15 NOTE — TELEPHONE ENCOUNTER
No care due was identified.  [facility] Embedded Care Due Messages. Reference number: [id number].   10/15/2024 1:01:33 PM CDT

## 2024-10-16 RX ORDER — PANTOPRAZOLE SODIUM 40 MG/1
40 TABLET, DELAYED RELEASE ORAL
Qty: 90 TABLET | Refills: 4 | Status: SHIPPED | OUTPATIENT
Start: 2024-10-16

## 2024-10-16 NOTE — TELEPHONE ENCOUNTER
Refill Routing Note   Medication(s) are not appropriate for processing by [facility] for the following reason(s):        New or recently adjusted medication    [facility] action(s):  Defer               Appointments  past 12m or future 3m with PCP    Date Provider   Last Visit   9/19/2024 Arpan Guy MD   Next Visit   Visit date not found Arpan Guy MD   ED visits in past 90 days: 0        Note composed:10:02 PM 10/15/2024

## 2025-04-08 DIAGNOSIS — E78.5 DYSLIPIDEMIA: ICD-10-CM

## 2025-04-09 RX ORDER — ATORVASTATIN CALCIUM 20 MG/1
20 TABLET, FILM COATED ORAL NIGHTLY
Qty: 90 TABLET | Refills: 4 | Status: SHIPPED | OUTPATIENT
Start: 2025-04-09

## 2025-04-09 NOTE — TELEPHONE ENCOUNTER
No care due was identified.  [facility] Embedded Care Due Messages. Reference number: [id number].   4/08/2025 8:19:04 PM CDT  
.